# Patient Record
Sex: MALE | Race: WHITE | Employment: OTHER | URBAN - METROPOLITAN AREA
[De-identification: names, ages, dates, MRNs, and addresses within clinical notes are randomized per-mention and may not be internally consistent; named-entity substitution may affect disease eponyms.]

---

## 2019-02-11 ENCOUNTER — HOSPITAL ENCOUNTER (OUTPATIENT)
Age: 49
Discharge: HOME | End: 2019-02-13
Payer: COMMERCIAL

## 2019-02-11 ENCOUNTER — HOSPITAL ENCOUNTER (OUTPATIENT)
Dept: ULTRASOUND IMAGING | Age: 49
Discharge: HOME | End: 2019-02-13
Payer: COMMERCIAL

## 2019-02-11 DIAGNOSIS — R09.89 CARDIORESPIRATORY PROBLEMS: ICD-10-CM

## 2019-02-11 PROCEDURE — 93880 EXTRACRANIAL BILAT STUDY: CPT

## 2021-07-13 ENCOUNTER — HOSPITAL ENCOUNTER (OUTPATIENT)
Dept: CT IMAGING | Age: 51
Discharge: HOME | End: 2021-07-15
Payer: COMMERCIAL

## 2021-07-13 DIAGNOSIS — J32.9 CHRONIC SINUSITIS, UNSPECIFIED LOCATION: ICD-10-CM

## 2021-07-13 PROCEDURE — 70486 CT MAXILLOFACIAL W/O DYE: CPT

## 2024-06-26 ENCOUNTER — APPOINTMENT (OUTPATIENT)
Dept: GENERAL RADIOLOGY | Age: 54
End: 2024-06-26
Payer: COMMERCIAL

## 2024-06-26 ENCOUNTER — APPOINTMENT (OUTPATIENT)
Dept: INTERVENTIONAL RADIOLOGY/VASCULAR | Age: 54
DRG: 024 | End: 2024-06-26
Payer: COMMERCIAL

## 2024-06-26 ENCOUNTER — ANESTHESIA EVENT (OUTPATIENT)
Dept: INTERVENTIONAL RADIOLOGY/VASCULAR | Age: 54
End: 2024-06-26
Payer: COMMERCIAL

## 2024-06-26 ENCOUNTER — ANESTHESIA (OUTPATIENT)
Dept: INTERVENTIONAL RADIOLOGY/VASCULAR | Age: 54
End: 2024-06-26
Payer: COMMERCIAL

## 2024-06-26 ENCOUNTER — APPOINTMENT (OUTPATIENT)
Dept: CT IMAGING | Age: 54
End: 2024-06-26
Payer: COMMERCIAL

## 2024-06-26 ENCOUNTER — HOSPITAL ENCOUNTER (INPATIENT)
Age: 54
LOS: 5 days | Discharge: HOME HEALTH | DRG: 024 | End: 2024-07-01
Attending: EMERGENCY MEDICINE | Admitting: INTERNAL MEDICINE
Payer: COMMERCIAL

## 2024-06-26 ENCOUNTER — HOSPITAL ENCOUNTER (EMERGENCY)
Age: 54
Discharge: ANOTHER HOSPITAL | End: 2024-06-26
Attending: EMERGENCY MEDICINE
Payer: COMMERCIAL

## 2024-06-26 VITALS
WEIGHT: 260 LBS | HEART RATE: 76 BPM | OXYGEN SATURATION: 99 % | SYSTOLIC BLOOD PRESSURE: 156 MMHG | HEIGHT: 72 IN | RESPIRATION RATE: 14 BRPM | BODY MASS INDEX: 35.21 KG/M2 | TEMPERATURE: 98 F | DIASTOLIC BLOOD PRESSURE: 96 MMHG

## 2024-06-26 DIAGNOSIS — I63.511 CEREBROVASCULAR ACCIDENT (CVA) DUE TO OCCLUSION OF RIGHT MIDDLE CEREBRAL ARTERY (HCC): Primary | ICD-10-CM

## 2024-06-26 DIAGNOSIS — I63.9 CEREBROVASCULAR ACCIDENT (CVA), UNSPECIFIED MECHANISM (HCC): Primary | ICD-10-CM

## 2024-06-26 PROBLEM — I61.9 CVA (CEREBROVASCULAR ACCIDENT DUE TO INTRACEREBRAL HEMORRHAGE) (HCC): Status: ACTIVE | Noted: 2024-06-26

## 2024-06-26 LAB
ALBUMIN SERPL-MCNC: 4.5 G/DL (ref 3.5–5.2)
ALP SERPL-CCNC: 73 U/L (ref 40–129)
ALT SERPL-CCNC: 22 U/L (ref 0–40)
AMMONIA PLAS-SCNC: 16 UMOL/L (ref 16–60)
ANION GAP SERPL CALCULATED.3IONS-SCNC: 12 MMOL/L (ref 7–16)
APAP SERPL-MCNC: <5 UG/ML (ref 10–30)
AST SERPL-CCNC: 20 U/L (ref 0–39)
BACTERIA URNS QL MICRO: ABNORMAL
BASOPHILS # BLD: 0.04 K/UL (ref 0–0.2)
BASOPHILS NFR BLD: 1 % (ref 0–2)
BILIRUB SERPL-MCNC: 1.1 MG/DL (ref 0–1.2)
BILIRUB UR QL STRIP: NEGATIVE
BUN SERPL-MCNC: 14 MG/DL (ref 6–20)
CALCIUM SERPL-MCNC: 9.3 MG/DL (ref 8.6–10.2)
CHLORIDE SERPL-SCNC: 99 MMOL/L (ref 98–107)
CLARITY UR: CLEAR
CO2 SERPL-SCNC: 25 MMOL/L (ref 22–29)
COLOR UR: YELLOW
CREAT SERPL-MCNC: 1.4 MG/DL (ref 0.7–1.2)
EKG ATRIAL RATE: 74 BPM
EKG P AXIS: 65 DEGREES
EKG P-R INTERVAL: 160 MS
EKG Q-T INTERVAL: 414 MS
EKG QRS DURATION: 88 MS
EKG QTC CALCULATION (BAZETT): 459 MS
EKG R AXIS: 61 DEGREES
EKG T AXIS: 64 DEGREES
EKG VENTRICULAR RATE: 74 BPM
EOSINOPHIL # BLD: 0.09 K/UL (ref 0.05–0.5)
EOSINOPHILS RELATIVE PERCENT: 1 % (ref 0–6)
EPI CELLS #/AREA URNS HPF: ABNORMAL /HPF
ERYTHROCYTE [DISTWIDTH] IN BLOOD BY AUTOMATED COUNT: 13 % (ref 11.5–15)
ETHANOLAMINE SERPL-MCNC: <10 MG/DL (ref 0–0.08)
GFR, ESTIMATED: 63 ML/MIN/1.73M2
GLUCOSE SERPL-MCNC: 122 MG/DL (ref 74–99)
GLUCOSE UR STRIP-MCNC: NEGATIVE MG/DL
HCT VFR BLD AUTO: 49.5 % (ref 37–54)
HGB BLD-MCNC: 17.2 G/DL (ref 12.5–16.5)
HGB UR QL STRIP.AUTO: NEGATIVE
IMM GRANULOCYTES # BLD AUTO: <0.03 K/UL (ref 0–0.58)
IMM GRANULOCYTES NFR BLD: 0 % (ref 0–5)
KETONES UR STRIP-MCNC: NEGATIVE MG/DL
LEUKOCYTE ESTERASE UR QL STRIP: NEGATIVE
LYMPHOCYTES NFR BLD: 1.32 K/UL (ref 1.5–4)
LYMPHOCYTES RELATIVE PERCENT: 18 % (ref 20–42)
MCH RBC QN AUTO: 31.4 PG (ref 26–35)
MCHC RBC AUTO-ENTMCNC: 34.7 G/DL (ref 32–34.5)
MCV RBC AUTO: 90.5 FL (ref 80–99.9)
MONOCYTES NFR BLD: 0.61 K/UL (ref 0.1–0.95)
MONOCYTES NFR BLD: 8 % (ref 2–12)
NEUTROPHILS NFR BLD: 72 % (ref 43–80)
NEUTS SEG NFR BLD: 5.32 K/UL (ref 1.8–7.3)
NITRITE UR QL STRIP: NEGATIVE
PH UR STRIP: 6 [PH] (ref 5–9)
PLATELET # BLD AUTO: 278 K/UL (ref 130–450)
PMV BLD AUTO: 9.9 FL (ref 7–12)
POTASSIUM SERPL-SCNC: 3.7 MMOL/L (ref 3.5–5)
PROT SERPL-MCNC: 7.4 G/DL (ref 6.4–8.3)
PROT UR STRIP-MCNC: NEGATIVE MG/DL
RBC # BLD AUTO: 5.47 M/UL (ref 3.8–5.8)
RBC #/AREA URNS HPF: ABNORMAL /HPF
SALICYLATES SERPL-MCNC: <0.3 MG/DL (ref 0–30)
SODIUM SERPL-SCNC: 136 MMOL/L (ref 132–146)
SP GR UR STRIP: 1.02 (ref 1–1.03)
TOXIC TRICYCLIC SC,BLOOD: NEGATIVE
TROPONIN I SERPL HS-MCNC: 8 NG/L (ref 0–11)
TROPONIN I SERPL HS-MCNC: 8 NG/L (ref 0–11)
UROBILINOGEN UR STRIP-ACNC: 1 EU/DL (ref 0–1)
WBC #/AREA URNS HPF: ABNORMAL /HPF
WBC OTHER # BLD: 7.4 K/UL (ref 4.5–11.5)

## 2024-06-26 PROCEDURE — 3700000000 HC ANESTHESIA ATTENDED CARE: Performed by: STUDENT IN AN ORGANIZED HEALTH CARE EDUCATION/TRAINING PROGRAM

## 2024-06-26 PROCEDURE — 84484 ASSAY OF TROPONIN QUANT: CPT

## 2024-06-26 PROCEDURE — 80307 DRUG TEST PRSMV CHEM ANLYZR: CPT

## 2024-06-26 PROCEDURE — 36226 PLACE CATH VERTEBRAL ART: CPT | Performed by: STUDENT IN AN ORGANIZED HEALTH CARE EDUCATION/TRAINING PROGRAM

## 2024-06-26 PROCEDURE — 81001 URINALYSIS AUTO W/SCOPE: CPT

## 2024-06-26 PROCEDURE — 2580000003 HC RX 258: Performed by: EMERGENCY MEDICINE

## 2024-06-26 PROCEDURE — B31N1ZZ FLUOROSCOPY OF OTHER UPPER ARTERIES USING LOW OSMOLAR CONTRAST: ICD-10-PCS | Performed by: INTERNAL MEDICINE

## 2024-06-26 PROCEDURE — 82140 ASSAY OF AMMONIA: CPT

## 2024-06-26 PROCEDURE — 2580000003 HC RX 258: Performed by: NURSE ANESTHETIST, CERTIFIED REGISTERED

## 2024-06-26 PROCEDURE — 6360000004 HC RX CONTRAST MEDICATION: Performed by: STUDENT IN AN ORGANIZED HEALTH CARE EDUCATION/TRAINING PROGRAM

## 2024-06-26 PROCEDURE — 6360000004 HC RX CONTRAST MEDICATION: Performed by: RADIOLOGY

## 2024-06-26 PROCEDURE — 76377 3D RENDER W/INTRP POSTPROCES: CPT

## 2024-06-26 PROCEDURE — 2500000003 HC RX 250 WO HCPCS: Performed by: STUDENT IN AN ORGANIZED HEALTH CARE EDUCATION/TRAINING PROGRAM

## 2024-06-26 PROCEDURE — 6370000000 HC RX 637 (ALT 250 FOR IP): Performed by: STUDENT IN AN ORGANIZED HEALTH CARE EDUCATION/TRAINING PROGRAM

## 2024-06-26 PROCEDURE — 93005 ELECTROCARDIOGRAM TRACING: CPT

## 2024-06-26 PROCEDURE — 61645 PERQ ART M-THROMBECT &/NFS: CPT

## 2024-06-26 PROCEDURE — 2500000003 HC RX 250 WO HCPCS: Performed by: NURSE ANESTHETIST, CERTIFIED REGISTERED

## 2024-06-26 PROCEDURE — 2580000003 HC RX 258: Performed by: STUDENT IN AN ORGANIZED HEALTH CARE EDUCATION/TRAINING PROGRAM

## 2024-06-26 PROCEDURE — 99285 EMERGENCY DEPT VISIT HI MDM: CPT

## 2024-06-26 PROCEDURE — 3700000001 HC ADD 15 MINUTES (ANESTHESIA): Performed by: STUDENT IN AN ORGANIZED HEALTH CARE EDUCATION/TRAINING PROGRAM

## 2024-06-26 PROCEDURE — 6360000002 HC RX W HCPCS: Performed by: STUDENT IN AN ORGANIZED HEALTH CARE EDUCATION/TRAINING PROGRAM

## 2024-06-26 PROCEDURE — 61645 PERQ ART M-THROMBECT &/NFS: CPT | Performed by: STUDENT IN AN ORGANIZED HEALTH CARE EDUCATION/TRAINING PROGRAM

## 2024-06-26 PROCEDURE — 0042T CT BRAIN PERFUSION: CPT

## 2024-06-26 PROCEDURE — 4A03X5D MEASUREMENT OF ARTERIAL FLOW, INTRACRANIAL, EXTERNAL APPROACH: ICD-10-PCS | Performed by: INTERNAL MEDICINE

## 2024-06-26 PROCEDURE — 93010 ELECTROCARDIOGRAM REPORT: CPT | Performed by: INTERNAL MEDICINE

## 2024-06-26 PROCEDURE — 80143 DRUG ASSAY ACETAMINOPHEN: CPT

## 2024-06-26 PROCEDURE — 85347 COAGULATION TIME ACTIVATED: CPT

## 2024-06-26 PROCEDURE — C1769 GUIDE WIRE: HCPCS

## 2024-06-26 PROCEDURE — 36226 PLACE CATH VERTEBRAL ART: CPT

## 2024-06-26 PROCEDURE — 99449 NTRPROF PH1/NTRNET/EHR 31/>: CPT | Performed by: PSYCHIATRY & NEUROLOGY

## 2024-06-26 PROCEDURE — G0480 DRUG TEST DEF 1-7 CLASSES: HCPCS

## 2024-06-26 PROCEDURE — 80179 DRUG ASSAY SALICYLATE: CPT

## 2024-06-26 PROCEDURE — 6360000002 HC RX W HCPCS: Performed by: NURSE ANESTHETIST, CERTIFIED REGISTERED

## 2024-06-26 PROCEDURE — B3131ZZ FLUOROSCOPY OF RIGHT COMMON CAROTID ARTERY USING LOW OSMOLAR CONTRAST: ICD-10-PCS | Performed by: INTERNAL MEDICINE

## 2024-06-26 PROCEDURE — 85025 COMPLETE CBC W/AUTO DIFF WBC: CPT

## 2024-06-26 PROCEDURE — 2000000000 HC ICU R&B

## 2024-06-26 PROCEDURE — B3161ZZ FLUOROSCOPY OF RIGHT INTERNAL CAROTID ARTERY USING LOW OSMOLAR CONTRAST: ICD-10-PCS | Performed by: INTERNAL MEDICINE

## 2024-06-26 PROCEDURE — 70450 CT HEAD/BRAIN W/O DYE: CPT

## 2024-06-26 PROCEDURE — 71045 X-RAY EXAM CHEST 1 VIEW: CPT

## 2024-06-26 PROCEDURE — 70496 CT ANGIOGRAPHY HEAD: CPT

## 2024-06-26 PROCEDURE — 70498 CT ANGIOGRAPHY NECK: CPT

## 2024-06-26 PROCEDURE — B31D1ZZ FLUOROSCOPY OF RIGHT VERTEBRAL ARTERY USING LOW OSMOLAR CONTRAST: ICD-10-PCS | Performed by: INTERNAL MEDICINE

## 2024-06-26 PROCEDURE — 03CG3Z7 EXTIRPATION OF MATTER FROM INTRACRANIAL ARTERY USING STENT RETRIEVER, PERCUTANEOUS APPROACH: ICD-10-PCS | Performed by: INTERNAL MEDICINE

## 2024-06-26 PROCEDURE — 37799 UNLISTED PX VASCULAR SURGERY: CPT

## 2024-06-26 PROCEDURE — 80053 COMPREHEN METABOLIC PANEL: CPT

## 2024-06-26 RX ORDER — 0.9 % SODIUM CHLORIDE 0.9 %
1000 INTRAVENOUS SOLUTION INTRAVENOUS ONCE
Status: COMPLETED | OUTPATIENT
Start: 2024-06-26 | End: 2024-06-26

## 2024-06-26 RX ORDER — SUCCINYLCHOLINE CHLORIDE 20 MG/ML
INJECTION INTRAMUSCULAR; INTRAVENOUS PRN
Status: DISCONTINUED | OUTPATIENT
Start: 2024-06-26 | End: 2024-06-26 | Stop reason: SDUPTHER

## 2024-06-26 RX ORDER — FENTANYL CITRATE 50 UG/ML
INJECTION, SOLUTION INTRAMUSCULAR; INTRAVENOUS PRN
Status: DISCONTINUED | OUTPATIENT
Start: 2024-06-26 | End: 2024-06-26 | Stop reason: SDUPTHER

## 2024-06-26 RX ORDER — SODIUM CHLORIDE 9 MG/ML
INJECTION, SOLUTION INTRAVENOUS CONTINUOUS
Status: DISCONTINUED | OUTPATIENT
Start: 2024-06-26 | End: 2024-06-27

## 2024-06-26 RX ORDER — ASPIRIN 81 MG/1
324 TABLET, CHEWABLE ORAL ONCE
Status: COMPLETED | OUTPATIENT
Start: 2024-06-26 | End: 2024-06-26

## 2024-06-26 RX ORDER — PROPOFOL 10 MG/ML
INJECTION, EMULSION INTRAVENOUS PRN
Status: DISCONTINUED | OUTPATIENT
Start: 2024-06-26 | End: 2024-06-26 | Stop reason: SDUPTHER

## 2024-06-26 RX ORDER — HEPARIN SODIUM 10000 [USP'U]/ML
INJECTION, SOLUTION INTRAVENOUS; SUBCUTANEOUS PRN
Status: COMPLETED | OUTPATIENT
Start: 2024-06-26 | End: 2024-06-26

## 2024-06-26 RX ORDER — HYDRALAZINE HYDROCHLORIDE 20 MG/ML
10 INJECTION INTRAMUSCULAR; INTRAVENOUS EVERY 10 MIN PRN
Status: DISCONTINUED | OUTPATIENT
Start: 2024-06-26 | End: 2024-06-28

## 2024-06-26 RX ORDER — SODIUM CHLORIDE 9 MG/ML
INJECTION, SOLUTION INTRAVENOUS CONTINUOUS PRN
Status: DISCONTINUED | OUTPATIENT
Start: 2024-06-26 | End: 2024-06-26 | Stop reason: SDUPTHER

## 2024-06-26 RX ORDER — ROCURONIUM BROMIDE 10 MG/ML
INJECTION, SOLUTION INTRAVENOUS PRN
Status: DISCONTINUED | OUTPATIENT
Start: 2024-06-26 | End: 2024-06-26 | Stop reason: SDUPTHER

## 2024-06-26 RX ORDER — METOPROLOL SUCCINATE 50 MG/1
50 TABLET, EXTENDED RELEASE ORAL DAILY
Status: ON HOLD | COMMUNITY
Start: 2024-04-26

## 2024-06-26 RX ORDER — LABETALOL HYDROCHLORIDE 5 MG/ML
10 INJECTION, SOLUTION INTRAVENOUS EVERY 10 MIN PRN
Status: DISCONTINUED | OUTPATIENT
Start: 2024-06-26 | End: 2024-06-28

## 2024-06-26 RX ORDER — ONDANSETRON 2 MG/ML
INJECTION INTRAMUSCULAR; INTRAVENOUS PRN
Status: DISCONTINUED | OUTPATIENT
Start: 2024-06-26 | End: 2024-06-26 | Stop reason: SDUPTHER

## 2024-06-26 RX ORDER — LIDOCAINE HYDROCHLORIDE 20 MG/ML
INJECTION, SOLUTION EPIDURAL; INFILTRATION; INTRACAUDAL; PERINEURAL PRN
Status: DISCONTINUED | OUTPATIENT
Start: 2024-06-26 | End: 2024-06-26 | Stop reason: SDUPTHER

## 2024-06-26 RX ADMIN — Medication 5000 UNITS: at 17:14

## 2024-06-26 RX ADMIN — SODIUM CHLORIDE: 9 INJECTION, SOLUTION INTRAVENOUS at 20:05

## 2024-06-26 RX ADMIN — SUCCINYLCHOLINE CHLORIDE 100 MG: 20 INJECTION, SOLUTION INTRAMUSCULAR; INTRAVENOUS at 17:04

## 2024-06-26 RX ADMIN — SODIUM CHLORIDE: 9 INJECTION, SOLUTION INTRAVENOUS at 16:58

## 2024-06-26 RX ADMIN — SODIUM CHLORIDE 5 MG/HR: 0.9 INJECTION, SOLUTION INTRAVENOUS at 18:53

## 2024-06-26 RX ADMIN — PHENYLEPHRINE HYDROCHLORIDE 100 MCG: 10 INJECTION INTRAVENOUS at 17:24

## 2024-06-26 RX ADMIN — PHENYLEPHRINE HYDROCHLORIDE 50 MCG: 10 INJECTION INTRAVENOUS at 18:29

## 2024-06-26 RX ADMIN — SODIUM CHLORIDE 1000 ML: 9 INJECTION, SOLUTION INTRAVENOUS at 14:23

## 2024-06-26 RX ADMIN — PROPOFOL 180 MG: 10 INJECTION, EMULSION INTRAVENOUS at 17:04

## 2024-06-26 RX ADMIN — SUGAMMADEX 400 MG: 100 INJECTION, SOLUTION INTRAVENOUS at 18:53

## 2024-06-26 RX ADMIN — IOPAMIDOL 100 ML: 755 INJECTION, SOLUTION INTRAVENOUS at 15:08

## 2024-06-26 RX ADMIN — PHENYLEPHRINE HYDROCHLORIDE 100 MCG: 10 INJECTION INTRAVENOUS at 17:30

## 2024-06-26 RX ADMIN — PHENYLEPHRINE HYDROCHLORIDE 50 MCG: 10 INJECTION INTRAVENOUS at 18:33

## 2024-06-26 RX ADMIN — Medication 100 MG: at 17:04

## 2024-06-26 RX ADMIN — ROCURONIUM BROMIDE 10 MG: 10 INJECTION, SOLUTION INTRAVENOUS at 18:41

## 2024-06-26 RX ADMIN — ONDANSETRON HYDROCHLORIDE 4 MG: 2 SOLUTION INTRAMUSCULAR; INTRAVENOUS at 17:24

## 2024-06-26 RX ADMIN — ROCURONIUM BROMIDE 20 MG: 10 INJECTION, SOLUTION INTRAVENOUS at 17:38

## 2024-06-26 RX ADMIN — ROCURONIUM BROMIDE 20 MG: 10 INJECTION, SOLUTION INTRAVENOUS at 17:57

## 2024-06-26 RX ADMIN — Medication 1000 ML: at 17:17

## 2024-06-26 RX ADMIN — PHENYLEPHRINE HYDROCHLORIDE 50 MCG: 10 INJECTION INTRAVENOUS at 18:11

## 2024-06-26 RX ADMIN — PHENYLEPHRINE HYDROCHLORIDE 50 MCG: 10 INJECTION INTRAVENOUS at 18:25

## 2024-06-26 RX ADMIN — PHENYLEPHRINE HYDROCHLORIDE 50 MCG: 10 INJECTION INTRAVENOUS at 18:23

## 2024-06-26 RX ADMIN — PHENYLEPHRINE HYDROCHLORIDE 50 MCG: 10 INJECTION INTRAVENOUS at 18:07

## 2024-06-26 RX ADMIN — PHENYLEPHRINE HYDROCHLORIDE 50 MCG: 10 INJECTION INTRAVENOUS at 17:46

## 2024-06-26 RX ADMIN — PHENYLEPHRINE HYDROCHLORIDE 50 MCG: 10 INJECTION INTRAVENOUS at 18:37

## 2024-06-26 RX ADMIN — PHENYLEPHRINE HYDROCHLORIDE 50 MCG: 10 INJECTION INTRAVENOUS at 17:43

## 2024-06-26 RX ADMIN — PHENYLEPHRINE HYDROCHLORIDE 50 MCG: 10 INJECTION INTRAVENOUS at 17:50

## 2024-06-26 RX ADMIN — Medication 5000 UNITS: at 17:15

## 2024-06-26 RX ADMIN — IOPAMIDOL 100 ML: 612 INJECTION, SOLUTION INTRAVENOUS at 18:49

## 2024-06-26 RX ADMIN — SODIUM CHLORIDE: 9 INJECTION, SOLUTION INTRAVENOUS at 18:17

## 2024-06-26 RX ADMIN — FENTANYL CITRATE 100 MCG: 50 INJECTION, SOLUTION INTRAMUSCULAR; INTRAVENOUS at 17:04

## 2024-06-26 RX ADMIN — PHENYLEPHRINE HYDROCHLORIDE 50 MCG: 10 INJECTION INTRAVENOUS at 18:19

## 2024-06-26 RX ADMIN — PHENYLEPHRINE HYDROCHLORIDE 100 MCG: 10 INJECTION INTRAVENOUS at 17:19

## 2024-06-26 RX ADMIN — ASPIRIN 324 MG: 81 TABLET, CHEWABLE ORAL at 16:54

## 2024-06-26 RX ADMIN — PHENYLEPHRINE HYDROCHLORIDE 100 MCG: 10 INJECTION INTRAVENOUS at 17:28

## 2024-06-26 RX ADMIN — PHENYLEPHRINE HYDROCHLORIDE 50 MCG: 10 INJECTION INTRAVENOUS at 17:58

## 2024-06-26 RX ADMIN — ROCURONIUM BROMIDE 50 MG: 10 INJECTION, SOLUTION INTRAVENOUS at 17:11

## 2024-06-26 RX ADMIN — PHENYLEPHRINE HYDROCHLORIDE 50 MCG: 10 INJECTION INTRAVENOUS at 17:47

## 2024-06-26 RX ADMIN — PHENYLEPHRINE HYDROCHLORIDE 50 MCG: 10 INJECTION INTRAVENOUS at 17:41

## 2024-06-26 RX ADMIN — PHENYLEPHRINE HYDROCHLORIDE 100 MCG: 10 INJECTION INTRAVENOUS at 18:01

## 2024-06-26 RX ADMIN — PHENYLEPHRINE HYDROCHLORIDE 50 MCG: 10 INJECTION INTRAVENOUS at 18:13

## 2024-06-26 ASSESSMENT — LIFESTYLE VARIABLES
HOW MANY STANDARD DRINKS CONTAINING ALCOHOL DO YOU HAVE ON A TYPICAL DAY: 3 OR 4
HOW OFTEN DO YOU HAVE A DRINK CONTAINING ALCOHOL: 2-3 TIMES A WEEK

## 2024-06-26 ASSESSMENT — PAIN - FUNCTIONAL ASSESSMENT: PAIN_FUNCTIONAL_ASSESSMENT: NONE - DENIES PAIN

## 2024-06-26 NOTE — PROCEDURES
PROCEDURE NOTE  Date: 2024   Name: Iain Husain  YOB: 1970    Procedures: Acute thrombectomy    NEUROINTERVENTION PROCEDURE NOTE    PATIENT NAME: Iain Husain  MRN: [id number]  : 1970  DATE OF PROCEDURE: 24    Stroke Metrics  NIHSS prior to procedure: 3  IV TNK Administered: [] Yes  [x]  No  Consent obtained: [x] Yes  []  No  by [place]   Pedal Pulses checked: +2 bilaterally pre-procedure    Vitals completed per anesthesia    Neurointerventionalist: Bello Chowdhury MD  1st assistant Yamileth Mejia      Time Event Device Notes   1711 Access site puncture   Location: right femoral       1809 1st pass stent retriever and suction TICI Reperfusion thgthrthathdtheth:th th4th 1849 Access site closure 8F angioseal Sheath pulled and  Angioseal used to close arterial puncture. Puncture site cleansed and dry dressing applied. No bleeding, swelling or complications noted, no change in pulses.      IA tPA adminstered: [] Yes  [x]  No       Time Event Dose     IA tPA administered      IA tPA administered      IA tPA administered       1900 Post-procedure NIHSS unable to assess due to anesthesia/sedation and initial site assessment: site Right femoral WNL, no bleeding or hematoma noted, dressing dry and intact. +2 bilateral pedal pulses.      CT head completed.      Patient transported to [facility]  and handoff report given to Bedside RN    Family updated: [x] Yes  []  No    Blood pressure parameters: SBP <160    Antiplatelet Recommendations:  N/A    Any additional follow up scans:  CT head 2024 @ 0600

## 2024-06-26 NOTE — ANESTHESIA PRE PROCEDURE
Yisel APRN - CRNA   20 mg at 06/26/24 1738    phenylephrine (RUBY-SYNEPHRINE) injection   IntraVENous PRN ErichYisel, APRN - CRNA   50 mcg at 06/26/24 1746    ondansetron (ZOFRAN) injection   IntraVENous PRN Lisa Jungna, APRN - CRNA   4 mg at 06/26/24 1724    0.9 % sodium chloride infusion   IntraVENous Continuous PRN Yisel Jung, APRN - CRNA   New Bag at 06/26/24 1658       Allergies:  No Known Allergies    Problem List:    Patient Active Problem List   Diagnosis Code    CVA (cerebrovascular accident due to intracerebral hemorrhage) ([facility]) I61.9       Past Medical History:        Diagnosis Date    Hypertension        Past Surgical History:  No past surgical history on file.    Social History:    Social History     Tobacco Use    Smoking status: Never    Smokeless tobacco: Not on file   Substance Use Topics    Alcohol use: Yes     Comment: socially                                Counseling given: Not Answered      Vital Signs (Current):   Vitals:    06/26/24 1642 06/26/24 1649   BP: (!) 180/98    Pulse: 81    Resp: 14    Temp:  98.3 °F (36.8 °C)   TempSrc:  Oral   SpO2: 99%    Weight: 120.2 kg (265 lb)    Height: 1.88 m (6' 2\")                                               BP Readings from Last 3 Encounters:   06/26/24 (!) 180/98   06/26/24 (!) 156/96   12/24/15 141/89       NPO Status:                                                                                 BMI:   Wt Readings from Last 3 Encounters:   06/26/24 120.2 kg (265 lb)   06/26/24 117.9 kg (260 lb)   12/24/15 111.1 kg (245 lb)     Body mass index is 34.02 kg/m².    CBC:   Lab Results   Component Value Date/Time    WBC 7.4 06/26/2024 02:01 PM    RBC 5.47 06/26/2024 02:01 PM    HGB 17.2 06/26/2024 02:01 PM    HCT 49.5 06/26/2024 02:01 PM    MCV 90.5 06/26/2024 02:01 PM    RDW 13.0 06/26/2024 02:01 PM     06/26/2024 02:01 PM       CMP:   Lab Results   Component Value Date/Time     06/26/2024 02:01 PM    K 3.7 06/26/2024 02:01 PM

## 2024-06-26 NOTE — ED PROVIDER NOTES
HPI:  6/26/24,   Time: 4:33 PM EDT       Iain Husain is a 53 y.o. male presenting to the ED for stroke transfer with lvo, beginning 1 day ago.  The complaint has been persistent, severe in severity, and worsened by nothing.  [facility] from [facility].  Altered mental status for 24 hours.  Found to have right MCA occlusion at CTA on outside hospital.  Sent for interventional procedure.    Review of Systems:   Pertinent positives and negatives are stated within HPI, all other systems reviewed and are negative.          --------------------------------------------- PAST HISTORY ---------------------------------------------  Past Medical History:  has a past medical history of Hypertension.    Past Surgical History:  has no past surgical history on file.    Social History:  reports that he has never smoked. He does not have any smokeless tobacco history on file. He reports current alcohol use.    Family History: family history is not on file.     The patient’s home medications have been reviewed.    Allergies: Patient has no known allergies.        ---------------------------------------------------PHYSICAL EXAM--------------------------------------    Constitutional/General: Alert and oriented x1, distressed, anxious  Head: Normocephalic and atraumatic  Eyes: PERRL, EOMI, conjunctive normal, sclera non icteric  Mouth: Oropharynx clear, handling secretions, no trismus, no asymmetry of the posterior oropharynx or uvular edema  Neck: Supple, full ROM, non tender to palpation in the midline, no stridor, no crepitus, no meningeal signs  Respiratory: Lungs clear to auscultation bilaterally, no wheezes, rales, or rhonchi. Not in respiratory distress  Cardiovascular:  Regular rate. Regular rhythm. No murmurs, gallops, or rubs. 2+ distal pulses  Chest: No chest wall tenderness  GI:  Abdomen Soft, Non tender, Non distended.     Musculoskeletal: Moves all extremities x 4. Warm and well perfused,   Integument: skin warm and dry. No

## 2024-06-26 NOTE — CONSULTS
VASCULAR NEUROLOGY/NEUROINTERVENTIONAL SURGERY CONSULT NOTE    Consult requested by: ***  Attending: Paulette Howard MD   Reason for Consultation: ***    Patient: Iain Husain   : 1970   MRN: [id number]   Date of Service: 2024     History of Present Illness:  ***    Past Medical History:    Past Medical History:   Diagnosis Date    Hypertension        Past Surgical History:  No past surgical history on file.    Family History:  No family history on file.    Social History:  Social History       Tobacco History       Smoking Status  Never      Smokeless Tobacco Use  Unknown              Alcohol History       Alcohol Use Status  Yes Comment  socially              Drug Use       Drug Use Status  Not Asked              Sexual Activity       Sexually Active  Not Asked                    Allergies:  No Known Allergies     Review of Systems:   Constitutional: Denies fever, weight loss, fatigue and night sweats.   Neurological: Denies headache, confusion, sleep difficulties, lightheadedness, spinning sensation, vision loss, tremor, weakness, numbness or tingling, incoordination, imbalance, and incontinence of bowel and sexual dysfunction.  Psychiatric: Denies anxiety, depression and hallucinations.  Eyes: Denies blurred vision, double vision and eye pain.   ENMT: Denies difficulty swallowing, dental pain, hearing loss, and tinnitus.  Cardiovascular: Denies chest pain and palpitations.  Respiratory: Denies shortness of breath.  Genitourinary: Denies urinary incontinence and retention.  Integumentary: Denies rashes.  Endocrine: Denies polydipsia and polyuria.    Current Medications   heparin (porcine) 5000 Units in sodium chloride 0.9% 1000 mL irrigation, PRN  heparin (porcine) 5,000 Units with nitroGLYCERIN 100 mcg in sodium chloride 0.9% 1000 mL irrigation, PRN  iopamidol (ISOVUE-300) 61 % injection, PRN  dexmedeTOMIDine (PRECEDEX) 1,000 mcg in sodium chloride 0.9 % 250 mL infusion, Continuous    propofol  Test Left Arm Motor Drift {MOTOR-ARM:[id number]::\"0 - no drift, limb holds 90 (or 45) degrees for full 10 seconds\"}   5B: Test Right Arm Motor Drift {MOTOR-ARM:571126536::\"0 - no drift, limb holds 90 (or 45) degrees for full 10 seconds\"}   6A: Test Left Leg Motor Drift {MOTOR-LE::\"0 - no drift; leg holds 30 degree position for full 5 seconds\"}   6B: Test Right Leg Motor Drift {MOTOR-LE::\"0 - no drift; leg holds 30 degree position for full 5 seconds\"}   7: Test Limb Ataxia (FNF/Heel-Shin) {LIMB ATAXIA:923314054::\"0 - absent\"}   8: Test Sensation {SENSORY:422136731::\"0 - normal; no sensory loss\"}   9: Test Language/Aphasia {BEST LANGUAGE:488897544::\"0 - no aphasia, normal\"}   10: Test Dysarthria {DYSARTHRIA:917523512::\"0 - normal\"}   11: Test Extinction/Inattention {EXTINCTION AND INATTENTION:542753158::\"0 - no abnormality\"}   Total ***              Current Functional Status(Modified Oberlin Scale):  {Blank single:50544::\"6=Dead\",\"5=Severe disability; bedridden, incontinent and requiring constant nursing care and attention\",\"4=Moderately severe disability; unable to walk and attend to bodily needs without assistance\",\"3=Moderate disability; requiring some help, but able to walk without assistance\",\"2=Slight disability; unable to carry out all previous activities, but able to look after own affairs without assistance\",\"1=No significant disability despite symptoms; able to carry out all usual duties and activities\",\"0=No symptoms at all\"}     Neuroimaging:  ***    Labs:  {*** HELP TEXT ***    This SmartLink requires parameters for processing. Parameters allow for more information to be given to the SmartLink. This allows you to request specific information by giving the SmartLink precise direction.    The SmartLink accepts a list of result component base names  by commas. You can also request the number of results to display for each component. To indicate the number of results for each

## 2024-06-26 NOTE — PROGRESS NOTES
Patient admitted to NSICU with the following belongings:  Pants and Shirt. The following belongings admitted with the patient, none, were sent home with the patient's family.     4 Eyes Skin Assessment     NAME:  Iain Husain  YOB: 1970  MEDICAL RECORD NUMBER:  [id number]    The patient is being assessed for  Admission    I agree that at least one RN has performed a thorough Head to Toe Skin Assessment on the patient. ALL assessment sites listed below have been assessed.      Areas assessed by both nurses:    Head, Face, Ears, Shoulders, Back, Chest, Arms, Elbows, Hands, Sacrum. Buttock, Coccyx, Ischium, and Legs. Feet and Heels        Does the Patient have a Wound? No noted wound(s)       Kaden Prevention initiated by RN: Yes  Wound Care Orders initiated by RN: No    Pressure Injury (Stage 3,4, Unstageable, DTI, NWPT, and Complex wounds) if present, place Wound referral order by RN under : No    New Ostomies, if present place, Ostomy referral order under : No     Nurse 1 eSignature: Electronically signed by Barbara Lin RN on 6/27/24 at 4:07 PM EDT    **SHARE this note so that the co-signing nurse can place an eSignature**    Nurse 2 eSignature: Electronically signed by Uday Guerrero RN on 6/26/24 at 7:46 PM EDT

## 2024-06-26 NOTE — ANESTHESIA PROCEDURE NOTES
Arterial Line:    An arterial line was placed using surface landmarks, in the OR for the following indication(s): continuous blood pressure monitoring and blood sampling needed.    A 20 gauge (size), 1 and 3/8 inch (length), Arrow (type) catheter was placed, Seldinger technique not used, into the left radial artery, secured by tape and Tegaderm.  Anesthesia type: General    Events:  patient tolerated procedure well with no complications.6/26/2024 5:11 PM  Resident/CRNA: Yisel Jung APRN - CRNA  Performed: Resident/CRNA   Preanesthetic Checklist  Completed: patient identified, IV checked, site marked, risks and benefits discussed, surgical/procedural consents, equipment checked, pre-op evaluation, timeout performed, anesthesia consent given, oxygen available and monitors applied/VS acknowledged          
evaluation of HA, dizziness,   UA, Urine pregnancy, CBC, Fluids, meclizine, Zofran  reassess.

## 2024-06-26 NOTE — VIRTUAL HEALTH
[facility] Stroke and Telestroke Consult for  [facility] Stroke Alert through [facility] @ 14:02  6/26/2024 4:20 PM    Pt Name: Iain Husain  MRN: [id number]  YOB: 1970  Date of evaluation: 6/26/2024  Primary Care Physician: Girma Kay MD  Reason for Evaluation: Stroke Evaluation with Discussion with Ed or primary team with Telemedicine and stroke evaluation with Review of imaging and labs    Iain Husain is a 53 y.o. male who presents with history of htn and last well 14:30 on June 25, 2024. NIh 3 for encephalopathy, some perseveration and reported aphasia.  No motor deficits or facial droop. Out of window for iv tnk    LKS 6- 14:30  NIH:  3    Allergies  has No Known Allergies.  Medications  Prior to Admission medications    Medication Sig Start Date End Date Taking? Authorizing Provider   metoprolol succinate (TOPROL XL) 50 MG extended release tablet Take 1 tablet by mouth daily 4/26/24  Yes ProviderKonstantin MD   irbesartan-hydrochlorothiazide (AVALIDE) 150-12.5 MG per tablet Take 1 tablet by mouth daily    Provider, MD Konstantin    Scheduled Meds:  Continuous Infusions:  PRN Meds:.  Past Medical History   has a past medical history of Hypertension.  Social History  Social History     Socioeconomic History    Marital status:      Spouse name: Not on file    Number of children: Not on file    Years of education: Not on file    Highest education level: Not on file   Occupational History    Not on file   Tobacco Use    Smoking status: Never    Smokeless tobacco: Not on file   Substance and Sexual Activity    Alcohol use: Yes     Comment: socially    Drug use: Not on file    Sexual activity: Not on file   Other Topics Concern    Not on file   Social History Narrative    Not on file     Social Determinants of Health     Financial Resource Strain: Not on file   Food Insecurity: Not on file   Transportation Needs: Not on file   Physical Activity: Not

## 2024-06-26 NOTE — PROCEDURES
Neurointerventional surgery brief post procedure note    This is a brief post operative note that serves as immediate documentation to communicate with other clinicians and update them about the procedure.  For complete documentation purposes, the final operative report will be placed in PACS.  To review the document, please go under the imaging tab and click on the relevant IR procedure.      Date of Service: 24    Patient Name: Iain Husain   : 1970  Medical record number:  [id number]    Procedure: Emergent catheter-based cervical and cerebral angiogram with right MCA thrombectomy.  Physician: Bello Chowdhury MD.  Assistant: RT RadhaR.  Preoperative diagnosis: High-grade right MCA M2 stenosis versus mid to distal M2 occlusion.  Postoperative diagnosis: Subocclusive thrombus in right MCA M2 branch.  Access: Right common femoral artery.  Vessels injected: Right ICA, right MCA, right subclavian artery, right vertebral artery.  Hemostasis: 8 Turkish VIP Angio-Seal device.  Anesthesia: General anesthesia with endotracheal intubation.  Specimens: None.  Blood loss: 70 cc.  Complications: None immediate.    Impression/Findings:   1.  Equally dominant trifurcate right MCA M2 branching pattern.  There is layering of subocclusive thrombus in one of these M2 branches supplying the right temporoparietal region with significant capillary perfusion deficit.  This is now status post mechanical thrombectomy x 1 attempt using combined stent retriever and distal aspiration technique with complete recanalization of the right MCA vasculature.  2.  Residual, less than 50% stenosis of the right MCA M2 branch (which had subocclusive thrombus) at the end of thrombectomy.  3.  Intraoperative Patti CT head with no evidence of hemorrhage in the right hemisphere.    Plan:  1. Q15 min x 2 hours, Q30 min x 6 hours, Q60 min x 16 hours and then per unit protocol.   2. Strict bed rest for 3 hours post groin closure.  3.

## 2024-06-26 NOTE — ED PROVIDER NOTES
physician.    Records Reviewed: EKG from 12/24/2015 reviewed in comparison to today's.    I am the Primary Clinician of Record.    CONSULTS: (Who and What was discussed)  None    FINAL IMPRESSION      1. Cerebrovascular accident (CVA), unspecified mechanism (HCC)          DISPOSITION/PLAN   DISPOSITION Decision To Transfer 06/26/2024 03:51:28 PM    PATIENT REFERRED TO:  No follow-up provider specified.    DISCHARGE MEDICATIONS:  Discharge Medication List as of 6/26/2024  4:24 PM               (Please note that portions of this note were completed with a voice recognition program.  Efforts were made to edit the dictations but occasionally words are mis-transcribed.)    Rashid Schneider DO (electronically signed)

## 2024-06-27 ENCOUNTER — APPOINTMENT (OUTPATIENT)
Dept: CT IMAGING | Age: 54
DRG: 024 | End: 2024-06-27
Attending: STUDENT IN AN ORGANIZED HEALTH CARE EDUCATION/TRAINING PROGRAM
Payer: COMMERCIAL

## 2024-06-27 ENCOUNTER — APPOINTMENT (OUTPATIENT)
Age: 54
DRG: 024 | End: 2024-06-27
Attending: STUDENT IN AN ORGANIZED HEALTH CARE EDUCATION/TRAINING PROGRAM
Payer: COMMERCIAL

## 2024-06-27 ENCOUNTER — APPOINTMENT (OUTPATIENT)
Dept: MRI IMAGING | Age: 54
DRG: 024 | End: 2024-06-27
Payer: COMMERCIAL

## 2024-06-27 PROBLEM — I10 PRIMARY HYPERTENSION: Status: ACTIVE | Noted: 2024-06-27

## 2024-06-27 LAB
ACTIVATED CLOTTING TIME, LOW RANGE: 145 SEC
ANION GAP SERPL CALCULATED.3IONS-SCNC: 14 MMOL/L (ref 7–16)
BASOPHILS # BLD: 0.04 K/UL (ref 0–0.2)
BASOPHILS NFR BLD: 0 % (ref 0–2)
BUN SERPL-MCNC: 11 MG/DL (ref 6–20)
CA-I BLD-SCNC: 1.2 MMOL/L (ref 1.15–1.33)
CALCIUM SERPL-MCNC: 8.9 MG/DL (ref 8.6–10.2)
CHLORIDE SERPL-SCNC: 106 MMOL/L (ref 98–107)
CHOLEST SERPL-MCNC: 165 MG/DL
CO2 SERPL-SCNC: 20 MMOL/L (ref 22–29)
CREAT SERPL-MCNC: 1.1 MG/DL (ref 0.7–1.2)
EOSINOPHIL # BLD: 0.03 K/UL (ref 0.05–0.5)
EOSINOPHILS RELATIVE PERCENT: 0 % (ref 0–6)
ERYTHROCYTE [DISTWIDTH] IN BLOOD BY AUTOMATED COUNT: 13.1 % (ref 11.5–15)
GFR, ESTIMATED: 81 ML/MIN/1.73M2
GLUCOSE SERPL-MCNC: 108 MG/DL (ref 74–99)
HBA1C MFR BLD: 4.8 % (ref 4–5.6)
HCT VFR BLD AUTO: 45.2 % (ref 37–54)
HDLC SERPL-MCNC: 44 MG/DL
HGB BLD-MCNC: 15.5 G/DL (ref 12.5–16.5)
IMM GRANULOCYTES # BLD AUTO: 0.03 K/UL (ref 0–0.58)
IMM GRANULOCYTES NFR BLD: 0 % (ref 0–5)
LDLC SERPL CALC-MCNC: 102 MG/DL
LYMPHOCYTES NFR BLD: 1.12 K/UL (ref 1.5–4)
LYMPHOCYTES RELATIVE PERCENT: 11 % (ref 20–42)
MAGNESIUM SERPL-MCNC: 2 MG/DL (ref 1.6–2.6)
MCH RBC QN AUTO: 31.3 PG (ref 26–35)
MCHC RBC AUTO-ENTMCNC: 34.3 G/DL (ref 32–34.5)
MCV RBC AUTO: 91.1 FL (ref 80–99.9)
MONOCYTES NFR BLD: 0.81 K/UL (ref 0.1–0.95)
MONOCYTES NFR BLD: 8 % (ref 2–12)
NEUTROPHILS NFR BLD: 80 % (ref 43–80)
NEUTS SEG NFR BLD: 7.87 K/UL (ref 1.8–7.3)
PHOSPHATE SERPL-MCNC: 3.1 MG/DL (ref 2.5–4.5)
PLATELET # BLD AUTO: 247 K/UL (ref 130–450)
PMV BLD AUTO: 10.2 FL (ref 7–12)
POTASSIUM SERPL-SCNC: 4 MMOL/L (ref 3.5–5)
RBC # BLD AUTO: 4.96 M/UL (ref 3.8–5.8)
SODIUM SERPL-SCNC: 140 MMOL/L (ref 132–146)
TRIGL SERPL-MCNC: 93 MG/DL
VLDLC SERPL CALC-MCNC: 19 MG/DL
WBC OTHER # BLD: 9.9 K/UL (ref 4.5–11.5)

## 2024-06-27 PROCEDURE — 99252 IP/OBS CONSLTJ NEW/EST SF 35: CPT | Performed by: NURSE PRACTITIONER

## 2024-06-27 PROCEDURE — 92523 SPEECH SOUND LANG COMPREHEN: CPT | Performed by: SPEECH-LANGUAGE PATHOLOGIST

## 2024-06-27 PROCEDURE — 51701 INSERT BLADDER CATHETER: CPT

## 2024-06-27 PROCEDURE — 7100000001 HC PACU RECOVERY - ADDTL 15 MIN

## 2024-06-27 PROCEDURE — 6370000000 HC RX 637 (ALT 250 FOR IP): Performed by: STUDENT IN AN ORGANIZED HEALTH CARE EDUCATION/TRAINING PROGRAM

## 2024-06-27 PROCEDURE — 2000000000 HC ICU R&B

## 2024-06-27 PROCEDURE — 99233 SBSQ HOSP IP/OBS HIGH 50: CPT | Performed by: STUDENT IN AN ORGANIZED HEALTH CARE EDUCATION/TRAINING PROGRAM

## 2024-06-27 PROCEDURE — 6360000002 HC RX W HCPCS: Performed by: SURGERY

## 2024-06-27 PROCEDURE — 6360000002 HC RX W HCPCS: Performed by: STUDENT IN AN ORGANIZED HEALTH CARE EDUCATION/TRAINING PROGRAM

## 2024-06-27 PROCEDURE — 97535 SELF CARE MNGMENT TRAINING: CPT

## 2024-06-27 PROCEDURE — 51798 US URINE CAPACITY MEASURE: CPT

## 2024-06-27 PROCEDURE — 83036 HEMOGLOBIN GLYCOSYLATED A1C: CPT

## 2024-06-27 PROCEDURE — 97530 THERAPEUTIC ACTIVITIES: CPT

## 2024-06-27 PROCEDURE — 82330 ASSAY OF CALCIUM: CPT

## 2024-06-27 PROCEDURE — 70450 CT HEAD/BRAIN W/O DYE: CPT

## 2024-06-27 PROCEDURE — 85025 COMPLETE CBC W/AUTO DIFF WBC: CPT

## 2024-06-27 PROCEDURE — 37799 UNLISTED PX VASCULAR SURGERY: CPT

## 2024-06-27 PROCEDURE — 80061 LIPID PANEL: CPT

## 2024-06-27 PROCEDURE — 7100000000 HC PACU RECOVERY - FIRST 15 MIN

## 2024-06-27 PROCEDURE — 97166 OT EVAL MOD COMPLEX 45 MIN: CPT

## 2024-06-27 PROCEDURE — 93306 TTE W/DOPPLER COMPLETE: CPT

## 2024-06-27 PROCEDURE — 92507 TX SP LANG VOICE COMM INDIV: CPT | Performed by: SPEECH-LANGUAGE PATHOLOGIST

## 2024-06-27 PROCEDURE — 6370000000 HC RX 637 (ALT 250 FOR IP): Performed by: NURSE PRACTITIONER

## 2024-06-27 PROCEDURE — 80048 BASIC METABOLIC PNL TOTAL CA: CPT

## 2024-06-27 PROCEDURE — 83735 ASSAY OF MAGNESIUM: CPT

## 2024-06-27 PROCEDURE — 97162 PT EVAL MOD COMPLEX 30 MIN: CPT

## 2024-06-27 PROCEDURE — 84100 ASSAY OF PHOSPHORUS: CPT

## 2024-06-27 PROCEDURE — 70551 MRI BRAIN STEM W/O DYE: CPT

## 2024-06-27 RX ORDER — ACETAMINOPHEN 325 MG/1
650 TABLET ORAL EVERY 4 HOURS PRN
Status: DISCONTINUED | OUTPATIENT
Start: 2024-06-27 | End: 2024-07-01 | Stop reason: HOSPADM

## 2024-06-27 RX ORDER — CLOPIDOGREL BISULFATE 75 MG/1
75 TABLET ORAL DAILY
Status: DISCONTINUED | OUTPATIENT
Start: 2024-06-28 | End: 2024-07-01 | Stop reason: HOSPADM

## 2024-06-27 RX ORDER — CLOPIDOGREL BISULFATE 75 MG/1
300 TABLET ORAL ONCE
Status: COMPLETED | OUTPATIENT
Start: 2024-06-27 | End: 2024-06-27

## 2024-06-27 RX ORDER — ASPIRIN 325 MG
325 TABLET, DELAYED RELEASE (ENTERIC COATED) ORAL DAILY
Status: DISCONTINUED | OUTPATIENT
Start: 2024-06-27 | End: 2024-07-01 | Stop reason: HOSPADM

## 2024-06-27 RX ORDER — SENNOSIDES A AND B 8.6 MG/1
1 TABLET, FILM COATED ORAL NIGHTLY
Status: DISCONTINUED | OUTPATIENT
Start: 2024-06-27 | End: 2024-07-01 | Stop reason: HOSPADM

## 2024-06-27 RX ORDER — ENOXAPARIN SODIUM 100 MG/ML
30 INJECTION SUBCUTANEOUS EVERY 12 HOURS
Status: DISCONTINUED | OUTPATIENT
Start: 2024-06-27 | End: 2024-06-28

## 2024-06-27 RX ORDER — ONDANSETRON 2 MG/ML
4 INJECTION INTRAMUSCULAR; INTRAVENOUS EVERY 6 HOURS PRN
Status: DISCONTINUED | OUTPATIENT
Start: 2024-06-27 | End: 2024-07-01 | Stop reason: HOSPADM

## 2024-06-27 RX ORDER — LOSARTAN POTASSIUM 50 MG/1
50 TABLET ORAL DAILY
Status: DISCONTINUED | OUTPATIENT
Start: 2024-06-27 | End: 2024-07-01 | Stop reason: HOSPADM

## 2024-06-27 RX ORDER — ATORVASTATIN CALCIUM 40 MG/1
40 TABLET, FILM COATED ORAL NIGHTLY
Status: DISCONTINUED | OUTPATIENT
Start: 2024-06-27 | End: 2024-07-01 | Stop reason: HOSPADM

## 2024-06-27 RX ORDER — POLYETHYLENE GLYCOL 3350 17 G/17G
17 POWDER, FOR SOLUTION ORAL DAILY
Status: DISCONTINUED | OUTPATIENT
Start: 2024-06-27 | End: 2024-07-01 | Stop reason: HOSPADM

## 2024-06-27 RX ADMIN — LABETALOL HYDROCHLORIDE 10 MG: 5 INJECTION, SOLUTION INTRAVENOUS at 01:30

## 2024-06-27 RX ADMIN — ENOXAPARIN SODIUM 30 MG: 100 INJECTION SUBCUTANEOUS at 23:02

## 2024-06-27 RX ADMIN — ENOXAPARIN SODIUM 30 MG: 100 INJECTION SUBCUTANEOUS at 11:46

## 2024-06-27 RX ADMIN — METOPROLOL TARTRATE 25 MG: 25 TABLET, FILM COATED ORAL at 08:11

## 2024-06-27 RX ADMIN — ATORVASTATIN CALCIUM 40 MG: 40 TABLET, FILM COATED ORAL at 19:55

## 2024-06-27 RX ADMIN — METOPROLOL TARTRATE 25 MG: 25 TABLET, FILM COATED ORAL at 19:55

## 2024-06-27 RX ADMIN — LOSARTAN POTASSIUM 50 MG: 50 TABLET, FILM COATED ORAL at 08:11

## 2024-06-27 RX ADMIN — CLOPIDOGREL BISULFATE 300 MG: 75 TABLET ORAL at 18:36

## 2024-06-27 RX ADMIN — HYDRALAZINE HYDROCHLORIDE 10 MG: 20 INJECTION, SOLUTION INTRAMUSCULAR; INTRAVENOUS at 04:49

## 2024-06-27 RX ADMIN — ASPIRIN 325 MG: 325 TABLET, COATED ORAL at 11:46

## 2024-06-27 ASSESSMENT — PAIN SCALES - GENERAL
PAINLEVEL_OUTOF10: 0
PAINLEVEL_OUTOF10: 0

## 2024-06-27 NOTE — PROGRESS NOTES
SPEECH/LANGUAGE PATHOLOGY  SPEECH/LANGUAGE/COGNITIVE EVALUATION   and PLAN OF CARE      PATIENT NAME:  Iain Husain  (male)     MRN:  [id number]    :  1970  (53 y.o.)  STATUS:  Inpatient: Room 4524/4524-A    TODAY'S DATE:  24    SLP eval and treat  Start:  24,   End:  24,   ONE TIME,   Standing Count:  1 Occurrences,   R       Yolande, CONCHITA OrourkeN - CNS  REASON FOR REFERRAL:  s/p stroke s/p thrombectomy; only speech eval warranted per NP  EVALUATING THERAPIST: Sulma Bethea  SUPERVISING THERAPIST: Yvonne Ch    ADMITTING DIAGNOSIS: CVA (cerebrovascular accident due to intracerebral hemorrhage) (HCC) [I61.9]  Cerebrovascular accident (CVA) due to occlusion of right middle cerebral artery (HCC) [I63.511]    VISIT DIAGNOSIS:   Visit Diagnoses         Codes    Cerebrovascular accident (CVA) due to occlusion of right middle cerebral artery (HCC)    -  Primary I63.511               SPEECH THERAPY  PLAN OF CARE   The speech therapy  POC is established based on physician order, speech pathology diagnosis and results of clinical assessment     SPEECH PATHOLOGY DIAGNOSIS:    questionable receptive aphasia, moderate to severe expressive aphasia, cognitive-linguistic impairment r/t memory     Speech Pathology intervention is recommended up to 6 times per week for LOS or when goals are met with emphasis on the following:      Conditions Requiring Skilled Therapeutic Intervention for speech, language and/or cognition    Receptive Aphasia  Expressive Aphasia   Anomia  Decreased short and long term memory  Decreased thought organization    Specific Speech Therapy Interventions to Include:   Confrontational Naming task training   Therapeutic tasks for paraphasic errors  Receptive language training   Expressive language training   Therapeutic tasks for Cognition (particularly memory)    Specific instructions for next treatment:     To initiate POC    SHORT/LONG TERM  Active Problem List   Diagnosis    CVA (cerebrovascular accident due to intracerebral hemorrhage) (HCC)    Primary hypertension       INTERVENTION  CPT Code: 91352  speech/language tx    Pt was provided with carrier phrases, semantic and phonemic cueing to support word retrieval. Imitation tasks were broken down with articulatory and visual cues provided with limited success. Pt reported to be aware of perseveration. Alternate modalities of communication including gestures and writing were encouraged. Pt was also instructed on strategies to support retrieval  including describing word and returning to word with delay.     Sulma Bethea, ELVIA   Clinician

## 2024-06-27 NOTE — PROGRESS NOTES
VASCULAR NEUROLOGY/NEUROINTERVENTIONAL SURGERY PROGRESS NOTE    Subjective:  Status post right MCA thrombectomy last night.  No acute neurological events overnight.    Objective:    Awake, alert, does not follow commands consistently.  Intact horizontal and vertical extraocular movements.  Continues to have patchy visual field deficits (more pronounced in the inferior quadrant) in the left visual field.  Impaired comprehension, expression, naming and repetition.  I think he is right brain dominant even though he is right-handed.  No facial droop.  No long tract signs.    Heart sounds normal.  Sinus rhythm on telemetry monitor.  Chest clear.  Abdomen soft.  Peripheral pulses well felt.  Right groin site is clean, dry and intact with no evidence of hematoma.    NIH Stroke Scale:    1A: Level of Consciousness 0 - alert; keenly responsive   1B: Ask Month and Age 1 - answers one question correctly   1C: Tell Patient To Open and Close Eyes, then Hand  Squeeze 0 - performs both tasks correctly   2: Test Horizontal Extraocular Movements 0 - normal   3: Test Visual Fields 1 - partial hemianopia   4: Test Facial Palsy 0 - normal symmetric movement   5A: Test Left Arm Motor Drift 0 - no drift, limb holds 90 (or 45) degrees for full 10 seconds   5B: Test Right Arm Motor Drift 0 - no drift, limb holds 90 (or 45) degrees for full 10 seconds   6A: Test Left Leg Motor Drift 0 - no drift; leg holds 30 degree position for full 5 seconds   6B: Test Right Leg Motor Drift 0 - no drift; leg holds 30 degree position for full 5 seconds   7: Test Limb Ataxia (FNF/Heel-Shin) 0 - absent   8: Test Sensation 0 - normal; no sensory loss   9: Test Language/Aphasia 1 - mild to moderate aphasia; some obvious loss of fluency or facility of comprehension without significant limitation on ideas expressed or form of expression.  Reduction of speech and/or comprehension, however, makes conversation about provided materials difficult or impossible.

## 2024-06-27 NOTE — PROGRESS NOTES
OCCUPATIONAL THERAPY INITIAL EVALUATION    [facility]  [address], [place]       Date:2024                                                               Patient Name: Iain Husain  MRN: [id number]  : 1970  Room: [address] [place] [address]-A    Evaluating OT: Yesenia Jane, OTR/L [id number]    Referring Provider:   Haven Esparza APRN - CNS      Specific Provider Orders/Date: OT eval and treat (24)        Diagnosis: CVA (cerebrovascular accident due to intracerebral hemorrhage) (HCC) [I61.9]  Cerebrovascular accident (CVA) due to occlusion of right middle cerebral artery (HCC) [I63.511]         Reason for admission:  53 y.o. male presenting to the ED for stroke. Altered mental status for 24 hours. Imaging showed a right posterior parietal occipital infarction.  NIH 2.       Surgery/Procedures:  Right MCA thrombectomy            Pertinent Medical History:    Past Medical History:   Diagnosis Date    Hypertension           *Precautions:  Fall Risk, aphasia, SBP<160, visual impairment L field. alarms    Assessment of current deficits   [x] Functional mobility  [x]ADLs  [x] Strength               [x]Cognition   [x] Functional transfers   [x] IADLs         [x] Safety Awareness   [x]Endurance   [] Fine Coordination        [x] ROM     [x] Vision/perception   []Sensation    []Gross Motor Coordination [x] Balance   [] Delirium                  []Motor Control     [x] Communication    OT PLAN OF CARE   OT POC based on physician orders, patient diagnosis and results of clinical assessment.       Frequency/Duration: 1-3 days/wk for 1-2 weeks PRN    Specific OT Treatment to include:   ADL retraining/adapted techniques and AE recommendations to increase functional independence within precautions                    Energy conservation techniques to improve tolerance for selfcare routine   Functional transfer/mobility training/DME  [id number]   Orthotic Management 22954     Neuro Re-Ed [id number]     Non-Billable Time        Evaluation time includes thorough review of current medical information, gathering information on past medical history/social history and prior level of function, completion of standardized testing/informal observation of tasks, assessment of data and development of POC/Goals.     Yesenia Jane, OTR/L 9026

## 2024-06-27 NOTE — CONSULTS
[facility]  Critical Care  Critical Care Consult Note  6/27/2024      Date of Admission: 06/26/2024    CC: Follow up for stroke    HOSPITAL COURSE/OVERNIGHT EVENTS:  54 yo man presented to ED at [facility] with blurred vision & memory issues.  LKW was  hours prior to presentation. PMH includes HTN.  Initial /100.  NIH SS 2.  Imaging showed a right posterior parietal occipital infarction & right MCA M2 occlusion.  Not a candidate for TNK.  Transferred to [facility] for ongoing care.  ASA prior to procedure.  Underwent Right MCA thrombectomy  6/27 No issues overnight.  Passed swallow.  Did not require Cardene infusion.  Did receive 2 doses of antihypertensive agents.      PMH:   Past Medical History:   Diagnosis Date    Hypertension      PSH: No past surgical history on file.    Home Medications:   Prior to Admission medications    Medication Sig Start Date End Date Taking? Authorizing Provider   metoprolol succinate (TOPROL XL) 50 MG extended release tablet Take 1 tablet by mouth daily 4/26/24   ProviderKonstantin MD   irbesartan-hydrochlorothiazide (AVALIDE) 150-12.5 MG per tablet Take 1 tablet by mouth daily    Provider, MD Konstantin     Allergies: No Known Allergies    Social History:  Social History     Socioeconomic History    Marital status:      Spouse name: Not on file    Number of children: Not on file    Years of education: Not on file    Highest education level: Not on file   Occupational History    Not on file   Tobacco Use    Smoking status: Never    Smokeless tobacco: Not on file   Substance and Sexual Activity    Alcohol use: Yes     Comment: socially    Drug use: Not on file    Sexual activity: Not on file   Other Topics Concern    Not on file   Social History Narrative    Not on file     Social Determinants of Health     Financial Resource Strain: Not on file   Food Insecurity: Patient Unable To Answer (6/27/2024)    Hunger Vital Sign     Worried About Running Out

## 2024-06-27 NOTE — PLAN OF CARE
Problem: Discharge Planning  Goal: Discharge to home or other facility with appropriate resources  Outcome: Progressing     Problem: Skin/Tissue Integrity  Goal: Absence of new skin breakdown  Description: 1.  Monitor for areas of redness and/or skin breakdown  2.  Assess vascular access sites hourly  3.  Every 4-6 hours minimum:  Change oxygen saturation probe site  4.  Every 4-6 hours:  If on nasal continuous positive airway pressure, respiratory therapy assess nares and determine need for appliance change or resting period.  6/27/2024 0915 by Zhang Jefferson RN  Outcome: Progressing  6/27/2024 0052 by Kaylie Mullins RN  Outcome: Progressing     Problem: ABCDS Injury Assessment  Goal: Absence of physical injury  Outcome: Progressing     Problem: Neurosensory - Adult  Goal: Achieves stable or improved neurological status  6/27/2024 0915 by Zhang Jefferson RN  Outcome: Progressing  6/27/2024 0052 by Kaylie Mullins RN  Outcome: Progressing  Flowsheets (Taken 6/27/2024 0052)  Achieves stable or improved neurological status:   Assess for and report changes in neurological status   Initiate measures to prevent increased intracranial pressure   Maintain blood pressure and fluid volume within ordered parameters to optimize cerebral perfusion and minimize risk of hemorrhage   Monitor temperature, glucose, and sodium. Initiate appropriate interventions as ordered  Goal: Absence of seizures  6/27/2024 0915 by Zhang Jefferson RN  Outcome: Progressing  6/27/2024 0052 by Kaylie Mullins RN  Outcome: Progressing  Flowsheets (Taken 6/27/2024 0052)  Absence of seizures:   Monitor for seizure activity.  If seizure occurs, document type and location of movements and any associated apnea   If seizure occurs, turn head to side and suction secretions as needed   Administer anticonvulsants as ordered   Support airway/breathing, administer oxygen as needed   Diagnostic studies as ordered  Goal: Remains free of injury related to  injury  Outcome: Progressing     Problem: Pain  Goal: Verbalizes/displays adequate comfort level or baseline comfort level  Outcome: Progressing

## 2024-06-27 NOTE — PROGRESS NOTES
Physical Therapy  Physical Therapy Initial Assessment     Name: Iain Husain  : 1970  MRN: [id number]      Date of Service: 2024    Evaluating PT:  Cruz Womack PT, DPT [id number]    Room #:  4524/4524-A  Diagnosis:  CVA (cerebrovascular accident due to intracerebral hemorrhage) (HCC) [I61.9]  Cerebrovascular accident (CVA) due to occlusion of right middle cerebral artery (HCC) [I63.511]  PMHx/PSHx:    Past Medical History:   Diagnosis Date    Hypertension      Procedure/Surgery:   Emergent catheter-based cervical and cerebral angiogram with right MCA thrombectomy.   Precautions:  Falls, SPB <160, aphasia, vision, chair alarm   Equipment Needs:  TBD    SUBJECTIVE:    Pt lives with wife in a 1 story home with 1-2 step(s) to enter and 0 rail(s).      Pt ambulated with no AD PTA.    OBJECTIVE:   Initial Evaluation  Date: 24 Treatment Short Term/ Long Term   Goals   AM-PAC 6 Clicks      Was pt agreeable to Eval/treatment? Yes      Does pt have pain? No c/o pain      Bed Mobility  Rolling: NT  Supine to sit: Leonidas   Sit to supine: NT  Scooting: Leonidas   Independent    Transfers Sit to stand: Leonidas  Stand to sit: Leonidas  Stand pivot: Leonidas with no device   Independent    Ambulation   75 feet x 2 reps with Leonidas with no device   >400 feet with Independent    Stair negotiation: ascended and descended NT  >2 steps with 1 rail Mod Independent    ROM BUE:  Defer to OT note  BLE:  WFL     Strength BUE:  Defer to OT note  BLE:  4/5  Increase by 1/3 MMT grade   Balance Sitting EOB:  SBA  Dynamic Standing: Leonidas no device   Sitting EOB:  Independent   Dynamic Standing:  Independent      Pt is A & O x 2, self, location with choices  CAM-ICU: NT  RASS: 0  Sensation:  No reports of paresthesias   Edema:  none     Vitals:  Heart Rate at rest 64 bpm Heart Rate post session 71 bpm   SpO2 at rest 96% SpO2 post session 95%   Blood Pressure at rest 130/87 mmHg Blood Pressure post session 135/91 mmHg   BP ambulating:  contractures  [x] Safety and Education Training   [x] Patient/Caregiver Education   [] HEP  [] Other     PT long term treatment goals are located in above grid    Frequency of treatments: 2-5x/week x 1-2 weeks.    Time in  1304  Time out  1342    Total Treatment Time  23 minutes     Evaluation Time includes thorough review of current medical information, gathering information on past medical history/social history and prior level of function, completion of standardized testing/informal observation of tasks, assessment of data and education on plan of care and goals.    CPT codes:  [] Low Complexity PT evaluation 55589  [x] Moderate Complexity PT evaluation 35124  [] High Complexity PT evaluation 97960  [] PT Re-evaluation 35066  [] Gait training 71537 - minutes  [] Manual therapy 05209 - minutes  [x] Therapeutic activities 08411 23 minutes  [] Therapeutic exercises 16291 - minutes  [] Neuromuscular reeducation 42651 - minutes     Sharifa White, SPT   Cruz Womack, PT, DPT  [id number]

## 2024-06-27 NOTE — H&P
[facility]  History and Physical      CHIEF COMPLAINT:    Chief Complaint   Patient presents with    Cerebrovascular Accident     Transfer from [facility] for stroke        Patient of Girma Kay MD presents with:  CVA (cerebrovascular accident due to intracerebral hemorrhage) (HCC)    History of Present Illness:   Patient is a 53-year-old male with a past medical history of hypertension who presents to the emergency room after ER to ER transfer from [facility] for altered mental status.  Patient was found to be altered by his wife but that he had been complaining of blurry vision and difficulty with his memory.  Patient had a series of CT scans with a CT head revealing findings suspicious for acute ischemia of the right posterior parietal lobe and right occipital lobe. 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 And a CT brain perfusion with ischemic penumbra of the right posterior cerebral hemisphere and a core infarct of the right posterior cerebral hemisphere.  Neuro IR was consulted and patient was taken for an emergent catheter-based cervical and cerebral angiogram with right MCA thrombectomy.  Labs revealed a mildly elevated creatinine of 1.4 however other labs relatively unremarkable.  Patient was transferred to the ICU for closer monitoring and further workup and treatment.    On evaluation he is resting comfortably in bed in no acute distress, some degree of aphasia is noted he is undergoing echocardiogram.  Family members are at bedside with whom case was discussed headaches or visual changes on my evaluation.    REVIEW OF SYSTEMS:  Pertinent negatives are above in HPI.  10 point ROS otherwise negative.      Past Medical History:   Diagnosis Date    Hypertension          No past surgical history on file.    Medications Prior to  prophylaxis PCD's  PT OT  Discharge planning      Code status: Full  Requires inpatient level of care      I have spent a total time of 70 minutes of this patient encounter reviewing chart, labs, coordinating care with interdisciplinary teams, face to face encounter with patient, providing counseling/education to patient/family.      Paulette Howard MD  6/28/2024

## 2024-06-27 NOTE — ANESTHESIA POSTPROCEDURE EVALUATION
Department of Anesthesiology  Postprocedure Note    Patient: Iain Husain  MRN: [id number]  YOB: 1970  Date of evaluation: 6/27/2024    Procedure Summary       Date: 06/26/24 Room / Location: [facility] Special Procedures; [facility] Radiology    Anesthesia Start: 1658 Anesthesia Stop: 1925    Procedures:       IR MECHANICAL ART THROMBECTOMY INTRACRANIAL      IR CARLOS CATH PLACE VERTEBRAL ART RIGHT W OR WO ANGIO Diagnosis:       (Right Distal M2)      (Right Distal M2)    Scheduled Providers: Bello Chowdhury MD Responsible Provider: Iona Witt MD    Anesthesia Type: general ASA Status: 4 - Emergent            Anesthesia Type: No value filed.    Ivy Phase I: Ivy Score: 10    Ivy Phase II:      Anesthesia Post Evaluation    Patient location during evaluation: ICU  Post-procedure mental status: back to baseline.  Airway patency: patent  Nausea & Vomiting: no nausea and no vomiting  Cardiovascular status: hemodynamically stable  Respiratory status: acceptable  Hydration status: euvolemic  Pain management: adequate        No notable events documented.

## 2024-06-27 NOTE — ACP (ADVANCE CARE PLANNING)
Advance Care Planning   Healthcare Decision Maker:    Primary Decision Maker: Candida Husain - [facility] - [phone]    Click here to complete Healthcare Decision Makers including selection of the Healthcare Decision Maker Relationship (ie \"Primary\").

## 2024-06-27 NOTE — PROGRESS NOTES
Physical Therapy  Physical Therapy Note    Name: Iian Husain  : 1970  MRN: [id number]      Date of Service: 2024  Pt would benefit from acute rehab at discharge and is in need of a PM&R consult.    Cruz Womack, PT, DPT  [id number]

## 2024-06-28 ENCOUNTER — APPOINTMENT (OUTPATIENT)
Dept: CT IMAGING | Age: 54
DRG: 024 | End: 2024-06-28
Payer: COMMERCIAL

## 2024-06-28 PROBLEM — I63.511 CEREBROVASCULAR ACCIDENT (CVA) DUE TO OCCLUSION OF RIGHT MIDDLE CEREBRAL ARTERY (HCC): Status: ACTIVE | Noted: 2024-06-28

## 2024-06-28 LAB
ANION GAP SERPL CALCULATED.3IONS-SCNC: 12 MMOL/L (ref 7–16)
BASOPHILS # BLD: 0.04 K/UL (ref 0–0.2)
BASOPHILS NFR BLD: 1 % (ref 0–2)
BUN SERPL-MCNC: 12 MG/DL (ref 6–20)
CA-I BLD-SCNC: 1.2 MMOL/L (ref 1.15–1.33)
CALCIUM SERPL-MCNC: 8.8 MG/DL (ref 8.6–10.2)
CHLORIDE SERPL-SCNC: 103 MMOL/L (ref 98–107)
CO2 SERPL-SCNC: 22 MMOL/L (ref 22–29)
CREAT SERPL-MCNC: 1.1 MG/DL (ref 0.7–1.2)
ECHO AO ASC DIAM: 3.3 CM
ECHO AO ASCENDING AORTA INDEX: 1.35 CM/M2
ECHO AO SINUS VALSALVA DIAM: 4.1 CM
ECHO AO SINUS VALSALVA INDEX: 1.67 CM/M2
ECHO AV AREA PEAK VELOCITY: 3 CM2
ECHO AV AREA VTI: 3.8 CM2
ECHO AV AREA/BSA PEAK VELOCITY: 1.2 CM2/M2
ECHO AV AREA/BSA VTI: 1.6 CM2/M2
ECHO AV CUSP MM: 2.6 CM
ECHO AV MEAN GRADIENT: 4 MMHG
ECHO AV MEAN VELOCITY: 0.9 M/S
ECHO AV PEAK GRADIENT: 7 MMHG
ECHO AV PEAK VELOCITY: 1.3 M/S
ECHO AV VELOCITY RATIO: 0.69
ECHO AV VTI: 26 CM
ECHO BSA: 2.51 M2
ECHO LA DIAMETER INDEX: 1.39 CM/M2
ECHO LA DIAMETER: 3.4 CM
ECHO LA VOL A-L A2C: 38 ML (ref 18–58)
ECHO LA VOL A-L A4C: 40 ML (ref 18–58)
ECHO LA VOL MOD A2C: 37 ML (ref 18–58)
ECHO LA VOL MOD A4C: 36 ML (ref 18–58)
ECHO LA VOLUME AREA LENGTH: 41 ML
ECHO LA VOLUME INDEX A-L A2C: 16 ML/M2 (ref 16–34)
ECHO LA VOLUME INDEX A-L A4C: 16 ML/M2 (ref 16–34)
ECHO LA VOLUME INDEX AREA LENGTH: 17 ML/M2 (ref 16–34)
ECHO LA VOLUME INDEX MOD A2C: 15 ML/M2 (ref 16–34)
ECHO LA VOLUME INDEX MOD A4C: 15 ML/M2 (ref 16–34)
ECHO LV FRACTIONAL SHORTENING: 26 % (ref 28–44)
ECHO LV INTERNAL DIMENSION DIASTOLE INDEX: 1.92 CM/M2
ECHO LV INTERNAL DIMENSION DIASTOLIC: 4.7 CM (ref 4.2–5.9)
ECHO LV INTERNAL DIMENSION SYSTOLIC INDEX: 1.43 CM/M2
ECHO LV INTERNAL DIMENSION SYSTOLIC: 3.5 CM
ECHO LV IVSD: 1.1 CM (ref 0.6–1)
ECHO LV IVSS: 1.6 CM
ECHO LV MASS 2D: 199.6 G (ref 88–224)
ECHO LV MASS INDEX 2D: 81.5 G/M2 (ref 49–115)
ECHO LV POSTERIOR WALL DIASTOLIC: 1.2 CM (ref 0.6–1)
ECHO LV POSTERIOR WALL SYSTOLIC: 1.4 CM
ECHO LV RELATIVE WALL THICKNESS RATIO: 0.51
ECHO LVOT AREA: 4.5 CM2
ECHO LVOT AV VTI INDEX: 0.83
ECHO LVOT DIAM: 2.4 CM
ECHO LVOT MEAN GRADIENT: 2 MMHG
ECHO LVOT PEAK GRADIENT: 3 MMHG
ECHO LVOT PEAK VELOCITY: 0.9 M/S
ECHO LVOT STROKE VOLUME INDEX: 40 ML/M2
ECHO LVOT SV: 98.1 ML
ECHO LVOT VTI: 21.7 CM
ECHO MV A VELOCITY: 1 M/S
ECHO MV AREA VTI: 3.2 CM2
ECHO MV E DECELERATION TIME (DT): 305.3 MS
ECHO MV E VELOCITY: 0.88 M/S
ECHO MV E/A RATIO: 0.88
ECHO MV LVOT VTI INDEX: 1.41
ECHO MV MAX VELOCITY: 1 M/S
ECHO MV MEAN GRADIENT: 2 MMHG
ECHO MV MEAN VELOCITY: 0.7 M/S
ECHO MV PEAK GRADIENT: 4 MMHG
ECHO MV VTI: 30.7 CM
ECHO PVEIN PEAK D VELOCITY: 0.3 M/S
ECHO PVEIN PEAK S VELOCITY: 0.4 M/S
ECHO PVEIN S/D RATIO: 1.3
ECHO RV INTERNAL DIMENSION: 3.3 CM
EOSINOPHIL # BLD: 0.14 K/UL (ref 0.05–0.5)
EOSINOPHILS RELATIVE PERCENT: 2 % (ref 0–6)
ERYTHROCYTE [DISTWIDTH] IN BLOOD BY AUTOMATED COUNT: 13.2 % (ref 11.5–15)
GFR, ESTIMATED: 81 ML/MIN/1.73M2
GLUCOSE SERPL-MCNC: 103 MG/DL (ref 74–99)
HCT VFR BLD AUTO: 46 % (ref 37–54)
HGB BLD-MCNC: 16.3 G/DL (ref 12.5–16.5)
IMM GRANULOCYTES # BLD AUTO: 0.03 K/UL (ref 0–0.58)
IMM GRANULOCYTES NFR BLD: 0 % (ref 0–5)
LYMPHOCYTES NFR BLD: 1.34 K/UL (ref 1.5–4)
LYMPHOCYTES RELATIVE PERCENT: 19 % (ref 20–42)
MAGNESIUM SERPL-MCNC: 2 MG/DL (ref 1.6–2.6)
MCH RBC QN AUTO: 32.7 PG (ref 26–35)
MCHC RBC AUTO-ENTMCNC: 35.4 G/DL (ref 32–34.5)
MCV RBC AUTO: 92.2 FL (ref 80–99.9)
MONOCYTES NFR BLD: 0.72 K/UL (ref 0.1–0.95)
MONOCYTES NFR BLD: 10 % (ref 2–12)
NEUTROPHILS NFR BLD: 67 % (ref 43–80)
NEUTS SEG NFR BLD: 4.63 K/UL (ref 1.8–7.3)
PHOSPHATE SERPL-MCNC: 3.1 MG/DL (ref 2.5–4.5)
PLATELET # BLD AUTO: 240 K/UL (ref 130–450)
PMV BLD AUTO: 10.2 FL (ref 7–12)
POTASSIUM SERPL-SCNC: 3.9 MMOL/L (ref 3.5–5)
RBC # BLD AUTO: 4.99 M/UL (ref 3.8–5.8)
SODIUM SERPL-SCNC: 137 MMOL/L (ref 132–146)
WBC OTHER # BLD: 6.9 K/UL (ref 4.5–11.5)

## 2024-06-28 PROCEDURE — 6370000000 HC RX 637 (ALT 250 FOR IP): Performed by: NURSE PRACTITIONER

## 2024-06-28 PROCEDURE — 82330 ASSAY OF CALCIUM: CPT

## 2024-06-28 PROCEDURE — 70450 CT HEAD/BRAIN W/O DYE: CPT

## 2024-06-28 PROCEDURE — 93306 TTE W/DOPPLER COMPLETE: CPT | Performed by: INTERNAL MEDICINE

## 2024-06-28 PROCEDURE — 99291 CRITICAL CARE FIRST HOUR: CPT | Performed by: NURSE PRACTITIONER

## 2024-06-28 PROCEDURE — 97530 THERAPEUTIC ACTIVITIES: CPT

## 2024-06-28 PROCEDURE — 80048 BASIC METABOLIC PNL TOTAL CA: CPT

## 2024-06-28 PROCEDURE — 6360000002 HC RX W HCPCS: Performed by: STUDENT IN AN ORGANIZED HEALTH CARE EDUCATION/TRAINING PROGRAM

## 2024-06-28 PROCEDURE — 2060000000 HC ICU INTERMEDIATE R&B

## 2024-06-28 PROCEDURE — 83735 ASSAY OF MAGNESIUM: CPT

## 2024-06-28 PROCEDURE — 6370000000 HC RX 637 (ALT 250 FOR IP): Performed by: STUDENT IN AN ORGANIZED HEALTH CARE EDUCATION/TRAINING PROGRAM

## 2024-06-28 PROCEDURE — 85025 COMPLETE CBC W/AUTO DIFF WBC: CPT

## 2024-06-28 PROCEDURE — 97535 SELF CARE MNGMENT TRAINING: CPT

## 2024-06-28 PROCEDURE — 84100 ASSAY OF PHOSPHORUS: CPT

## 2024-06-28 RX ORDER — HYDRALAZINE HYDROCHLORIDE 20 MG/ML
10 INJECTION INTRAMUSCULAR; INTRAVENOUS EVERY 4 HOURS PRN
Status: DISCONTINUED | OUTPATIENT
Start: 2024-06-28 | End: 2024-07-01 | Stop reason: HOSPADM

## 2024-06-28 RX ORDER — LABETALOL HYDROCHLORIDE 5 MG/ML
10 INJECTION, SOLUTION INTRAVENOUS EVERY 4 HOURS PRN
Status: DISCONTINUED | OUTPATIENT
Start: 2024-06-28 | End: 2024-07-01 | Stop reason: HOSPADM

## 2024-06-28 RX ORDER — OXCARBAZEPINE 150 MG/1
600 TABLET, FILM COATED ORAL 2 TIMES DAILY
COMMUNITY

## 2024-06-28 RX ADMIN — ASPIRIN 325 MG: 325 TABLET, COATED ORAL at 07:23

## 2024-06-28 RX ADMIN — CLOPIDOGREL BISULFATE 75 MG: 75 TABLET ORAL at 07:23

## 2024-06-28 RX ADMIN — METOPROLOL TARTRATE 25 MG: 25 TABLET, FILM COATED ORAL at 07:23

## 2024-06-28 RX ADMIN — ENOXAPARIN SODIUM 30 MG: 100 INJECTION SUBCUTANEOUS at 09:28

## 2024-06-28 RX ADMIN — METOPROLOL TARTRATE 25 MG: 25 TABLET, FILM COATED ORAL at 19:33

## 2024-06-28 RX ADMIN — LOSARTAN POTASSIUM 50 MG: 50 TABLET, FILM COATED ORAL at 07:23

## 2024-06-28 RX ADMIN — ATORVASTATIN CALCIUM 40 MG: 40 TABLET, FILM COATED ORAL at 19:33

## 2024-06-28 ASSESSMENT — PAIN SCALES - GENERAL: PAINLEVEL_OUTOF10: 0

## 2024-06-28 NOTE — PROGRESS NOTES
procedure:  Right femoral.  No hematoma.      CONSTITUTIONAL: No acute distress, lying in hospital bed.    CARDIOVASCULAR: Monitor: NSR.  S1 S2.  Regular rate, regular rhythm.  .  No murmur/gallop/rub.  PULMONARY: Bilateral clear.   No rhonchi/rales/wheezes, no use of accessory muscles.  O2: Room air  RENAL:   Voids.  Fluid balance since admission:  - 384   ABDOMEN: Soft, nontender, nondistended, nontympanic, normal bowel sounds.   Diet:  Regular Low fat/low cholesterol/high fiber/2 G sodium.    Denies any nausea or vomiting.  Last BM:  PTA   MUSCULOSKELETAL: Moves all extremities purposefully  SKIN/EXTREMITIES: No rashes/ecchymosis, no edema/clubbing, warm/dry, good capillary refill.   LINES:   Left radial arterial line.  Day 1.  Good curve.    Peripheral    LABS:    Recent Labs     06/26/24  1401 06/27/24  0356 06/28/24  0417   WBC 7.4 9.9 6.9   HGB 17.2* 15.5 16.3   HCT 49.5 45.2 46.0   MCV 90.5 91.1 92.2    247 240     Recent Labs     06/26/24  1401 06/27/24  0356 06/28/24  0417    140 137   K 3.7 4.0 3.9   CO2 25 20* 22   PHOS  --  3.1 3.1   BUN 14 11 12   CREATININE 1.4* 1.1 1.1     Diagnostic imaging:  Reviewed.       ASSESSMENT & PLAN     Patient Active Problem List    Diagnosis Date Noted    Cerebrovascular accident (CVA) due to occlusion of right middle cerebral artery (HCC) 06/28/2024    Primary hypertension 06/27/2024    CVA (cerebrovascular accident due to intracerebral hemorrhage) ([facility]) 06/26/2024     Neuro:  Right MCA ischemic stroke in right parietal & occipital lobe.    S/P Thrombectomy  Monitor neuro status.    Interventional neurology following.    Stroke education.  Stroke protocol.    Speech therapy for cognitive evaluation.    CV:  Hypertension.  Hx of HTN    Monitor hemodynamics.    BP goal systolic less than 160 mm Hg.    MAP goal is greater than 65 mm Hg.    PRN hydralzine & labetalol.    Statin.    ASA.  Plavix.  Losartan.  Metoprolol  Pulm: No acute issues.    Monitor RR &

## 2024-06-28 NOTE — PLAN OF CARE
Problem: Discharge Planning  Goal: Discharge to home or other facility with appropriate resources  6/28/2024 1942 by Rosenda Diaz RN  Outcome: Progressing  6/28/2024 0823 by Zhang Jefferson RN  Outcome: Progressing     Problem: Skin/Tissue Integrity  Goal: Absence of new skin breakdown  Description: 1.  Monitor for areas of redness and/or skin breakdown  2.  Assess vascular access sites hourly  3.  Every 4-6 hours minimum:  Change oxygen saturation probe site  4.  Every 4-6 hours:  If on nasal continuous positive airway pressure, respiratory therapy assess nares and determine need for appliance change or resting period.  6/28/2024 1942 by Rosenda Diaz RN  Outcome: Progressing  6/28/2024 0823 by Zhang Jefferson RN  Outcome: Progressing     Problem: ABCDS Injury Assessment  Goal: Absence of physical injury  6/28/2024 1942 by Rosenda Diaz RN  Outcome: Progressing  Flowsheets (Taken 6/28/2024 1915)  Absence of Physical Injury: Implement safety measures based on patient assessment  6/28/2024 0823 by Zhang Jefferson RN  Outcome: Progressing     Problem: Neurosensory - Adult  Goal: Achieves stable or improved neurological status  6/28/2024 1942 by Rosenda Diaz RN  Outcome: Progressing  6/28/2024 0823 by Zhang Jefferson RN  Outcome: Progressing  Goal: Absence of seizures  6/28/2024 1942 by Rosenda Diaz RN  Outcome: Progressing  6/28/2024 0823 by Zhang Jefferson RN  Outcome: Progressing  Goal: Remains free of injury related to seizures activity  6/28/2024 1942 by Rosenda Diaz RN  Outcome: Progressing  6/28/2024 0823 by Zhang Jefferson RN  Outcome: Progressing  Goal: Achieves maximal functionality and self care  6/28/2024 1942 by Rosenda Diaz RN  Outcome: Progressing  6/28/2024 0823 by Zhang Jefferson RN  Outcome: Progressing     Problem: Cardiovascular - Adult  Goal: Maintains optimal cardiac output and hemodynamic stability  6/28/2024 1942 by Rosenda Diaz RN  Outcome: Progressing  6/28/2024 0823 by

## 2024-06-28 NOTE — PLAN OF CARE
Problem: Discharge Planning  Goal: Discharge to home or other facility with appropriate resources  6/28/2024 0823 by Zhang Jefferson RN  Outcome: Progressing  6/27/2024 2023 by Augusto Forman RN  Outcome: Progressing     Problem: Skin/Tissue Integrity  Goal: Absence of new skin breakdown  Description: 1.  Monitor for areas of redness and/or skin breakdown  2.  Assess vascular access sites hourly  3.  Every 4-6 hours minimum:  Change oxygen saturation probe site  4.  Every 4-6 hours:  If on nasal continuous positive airway pressure, respiratory therapy assess nares and determine need for appliance change or resting period.  6/28/2024 0823 by Zhang Jefferson RN  Outcome: Progressing  6/27/2024 2023 by Augusto Forman RN  Outcome: Progressing     Problem: ABCDS Injury Assessment  Goal: Absence of physical injury  6/28/2024 0823 by Zhang Jefferson RN  Outcome: Progressing  6/27/2024 2023 by Augusto Forman RN  Outcome: Progressing     Problem: Neurosensory - Adult  Goal: Achieves stable or improved neurological status  6/28/2024 0823 by Zhang Jefferson RN  Outcome: Progressing  6/27/2024 2023 by Augusto Forman RN  Outcome: Progressing  Goal: Absence of seizures  6/28/2024 0823 by Zhang Jefferson RN  Outcome: Progressing  6/27/2024 2023 by Augusto Forman RN  Outcome: Progressing  Goal: Remains free of injury related to seizures activity  6/28/2024 0823 by Zhang Jefferson RN  Outcome: Progressing  6/27/2024 2023 by Augusto Forman RN  Outcome: Progressing  Goal: Achieves maximal functionality and self care  6/28/2024 0823 by Zhang Jefferson RN  Outcome: Progressing  6/27/2024 2023 by Augusto Forman RN  Outcome: Progressing     Problem: Cardiovascular - Adult  Goal: Maintains optimal cardiac output and hemodynamic stability  6/28/2024 0823 by Zhang Jefferson RN  Outcome: Progressing  6/27/2024 2023 by Augusto Forman RN  Outcome: Progressing     Problem: Skin/Tissue Integrity - Adult  Goal:  Skin integrity remains intact  6/28/2024 0823 by Zhang Jefferson RN  Outcome: Progressing  6/27/2024 2023 by Augusto Forman RN  Outcome: Progressing  Goal: Incisions, wounds, or drain sites healing without S/S of infection  6/28/2024 0823 by Zhang Jefferson RN  Outcome: Progressing  6/27/2024 2023 by Augusto Forman RN  Outcome: Progressing  Goal: Oral mucous membranes remain intact  6/28/2024 0823 by Zhang Jefferson RN  Outcome: Progressing  6/27/2024 2023 by Augusto Forman RN  Outcome: Progressing     Problem: Musculoskeletal - Adult  Goal: Return mobility to safest level of function  6/28/2024 0823 by Zhang Jefferson RN  Outcome: Progressing  6/27/2024 2023 by Augusto Forman RN  Outcome: Progressing  Goal: Maintain proper alignment of affected body part  6/28/2024 0823 by Zhang Jefferson RN  Outcome: Progressing  6/27/2024 2023 by Augusto Forman RN  Outcome: Progressing  Goal: Return ADL status to a safe level of function  6/28/2024 0823 by Zhang Jefferson RN  Outcome: Progressing  6/27/2024 2023 by Augusto Forman RN  Outcome: Progressing     Problem: Gastrointestinal - Adult  Goal: Minimal or absence of nausea and vomiting  6/28/2024 0823 by Zhang Jefferson RN  Outcome: Progressing  6/27/2024 2023 by Augusto Forman RN  Outcome: Progressing  Goal: Maintains or returns to baseline bowel function  6/28/2024 0823 by Zhang Jefferson RN  Outcome: Progressing  6/27/2024 2023 by Augusto Forman RN  Outcome: Progressing  Goal: Maintains adequate nutritional intake  6/28/2024 0823 by Zhang Jefferson RN  Outcome: Progressing  6/27/2024 2023 by Augusto Forman RN  Outcome: Progressing     Problem: Genitourinary - Adult  Goal: Absence of urinary retention  6/28/2024 0823 by Zhang Jefferson RN  Outcome: Progressing  6/27/2024 2023 by Petroski, Augusto, RN  Outcome: Progressing     Problem: Metabolic/Fluid and Electrolytes - Adult  Goal: Electrolytes maintained within normal limits  6/28/2024

## 2024-06-28 NOTE — PROGRESS NOTES
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 He will likely be to high functioning by the time ARU has a bed available. Anticipate discharge home with home therapy vs out patient therapy. Will continue to follow until discharged.

## 2024-06-28 NOTE — PROGRESS NOTES
[facility]   Progress note      Subjective:    Sitting up in chair and neuro ICU setting, completely conversant and feels great  Denies any acute complaints    Objective:    /81   Pulse 70   Temp 98.1 °F (36.7 °C) (Oral)   Resp 16   Ht 1.88 m (6' 2\")   Wt 120.2 kg (265 lb)   SpO2 96%   BMI 34.02 kg/m²     In: 1687.8 [P.O.:1020; I.V.:667.8]  Out: 3400   In: 1687.8   Out: 3400 [Urine:3400]    General appearance: NAD, conversant  HEENT: AT/NC, MMM  Neck: FROM, supple  Lungs: Clear to auscultation  CV: RRR, no MRGs  Vasc: Radial pulses 2+  Abdomen: Soft, non-tender; no masses or HSM  Extremities: No peripheral edema or digital cyanosis  Skin: no rash, lesions or ulcers  Psych: Alert and oriented to person, place and time  Neuro: Alert and interactive     Recent Labs     06/26/24  1401 06/27/24  0356 06/28/24  0417   WBC 7.4 9.9 6.9   HGB 17.2* 15.5 16.3   HCT 49.5 45.2 46.0    247 240       Recent Labs     06/26/24  1401 06/27/24  0356 06/28/24  0417    140 137   K 3.7 4.0 3.9   CL 99 106 103   CO2 25 20* 22   BUN 14 11 12   CREATININE 1.4* 1.1 1.1   CALCIUM 9.3 8.9 8.8       Assessment:    Principal Problem:    CVA (cerebrovascular accident due to intracerebral hemorrhage) (HCC)  Active Problems:    Primary hypertension    Cerebrovascular accident (CVA) due to occlusion of right middle cerebral artery (HCC)  Resolved Problems:    * No resolved hospital problems. 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

## 2024-06-28 NOTE — PROGRESS NOTES
Treatment: OT treatment provided this date:  Balance retraining: Performed sitting/standing balance ex's with instruction to facilitate righting reactions with postural changes during ADLS.  Pt feeling unsteady throughout session.      ADL retraining: Instruction on adapted techniques to increase independence and safe reach during dressing/bathing activities.  Pt demonstrated G understanding.    Cognition/Language/Perception: Language/word finding/ memory and & perceptual activities throughout session to improve comprehension/ communication skills. Pt continues with anomia (improving) and severe alexia.     Delirium Prevention: Environmental and sensory modifications assessed and implemented to decrease ICU acquired delirium and to improve overall orientation, mentation and pt interaction with family/staff.      Line management and environmental modifications made prior to and end of session to ensure patient safety and to increase efficiency of session.   Skilled monitoring of HR, O2 saturation, blood pressure and patient's response to activity performed throughout session.      Comments: OK from RN to see patient. Upon arrival, patient resting in bed, agreeable to session.  Pt demo good tolerance with fair understanding of education/techniques.  At end of session, patient left seated in chair to increase activity tolerance. Chair alarm activated.   Call light within reach, all lines and tubes intact. Pt instructed on use of call light for assistance and fall prevention.Overall, pt presents with decreased activity tolerance, dynamic balance, functional mobility and aphasia limiting completion of ADLs and safe return home. Pt can benefit from continued skilled OT to increase safety, functional independence & quality of life.     Pt has made good progress towards set goals.   Continue with current plan of care       Time In:1020              Time Out: 1047         Total Treatment Time: 27      Treatment Charges:

## 2024-06-28 NOTE — PROGRESS NOTES
Physical Therapy  Physical Therapy Treatment Note    Name: Iain Husain  : 1970  MRN: [id number]      Date of Service: 2024    Evaluating PT:  Cruz Womack, PT, DPT [id number]    Room #:  4524/4524-A  Diagnosis:  CVA (cerebrovascular accident due to intracerebral hemorrhage) (HCC) [I61.9]  Cerebrovascular accident (CVA) due to occlusion of right middle cerebral artery (HCC) [I63.511]  PMHx/PSHx:    Past Medical History:   Diagnosis Date    Hypertension      Procedure/Surgery:   Emergent catheter-based cervical and cerebral angiogram with right MCA thrombectomy.   Precautions:  Falls, SPB <160, aphasia, vision, chair alarm   Equipment Needs:  TBD    SUBJECTIVE:    Pt lives with wife in a 1 story home with 1-2 step(s) to enter and 0 rail(s).      Pt ambulated with no AD PTA.    OBJECTIVE:   Initial Evaluation  Date: 24 Treatment  24 Short Term/ Long Term   Goals   AM-PAC 6 Clicks     Was pt agreeable to Eval/treatment? Yes  Yes     Does pt have pain? No c/o pain  No c/o pain     Bed Mobility  Rolling: NT  Supine to sit: Leonidas   Sit to supine: NT  Scooting: Leonidas  Rolling: NT  Supine to sit: SBA   Sit to supine: NT  Scooting: SBA Independent    Transfers Sit to stand: Leonidas  Stand to sit: Leonidas  Stand pivot: Leonidas with no device  Sit to stand: SBA  Stand to sit: SBA  Stand pivot: Leonidas with no device  Independent    Ambulation   75 feet x 2 reps with Leonidas with no device  80 feet x 2 reps with Leonidas with no device  >400 feet with Independent    Stair negotiation: ascended and descended NT NT >2 steps with 1 rail Mod Independent    ROM BUE:  Defer to OT note  BLE:  WFL     Strength BUE:  Defer to OT note  BLE:  4/5  Increase by 1/3 MMT grade   Balance Sitting EOB:  SBA  Dynamic Standing: Leonidas no device  Sitting EOB:  SBA  Dynamic Standing: Leonidas no device  Sitting EOB:  Independent   Dynamic Standing:  Independent      Pt is A & O x 4  CAM-ICU: NT  RASS: 0  Sensation:  No reported of

## 2024-06-29 LAB
ANION GAP SERPL CALCULATED.3IONS-SCNC: 16 MMOL/L (ref 7–16)
BASOPHILS # BLD: 0.04 K/UL (ref 0–0.2)
BASOPHILS NFR BLD: 1 % (ref 0–2)
BUN SERPL-MCNC: 13 MG/DL (ref 6–20)
CALCIUM SERPL-MCNC: 9.2 MG/DL (ref 8.6–10.2)
CHLORIDE SERPL-SCNC: 102 MMOL/L (ref 98–107)
CO2 SERPL-SCNC: 19 MMOL/L (ref 22–29)
CREAT SERPL-MCNC: 1.1 MG/DL (ref 0.7–1.2)
EOSINOPHIL # BLD: 0.12 K/UL (ref 0.05–0.5)
EOSINOPHILS RELATIVE PERCENT: 2 % (ref 0–6)
ERYTHROCYTE [DISTWIDTH] IN BLOOD BY AUTOMATED COUNT: 12.8 % (ref 11.5–15)
GFR, ESTIMATED: 81 ML/MIN/1.73M2
GLUCOSE SERPL-MCNC: 102 MG/DL (ref 74–99)
HCT VFR BLD AUTO: 47.2 % (ref 37–54)
HGB BLD-MCNC: 16.8 G/DL (ref 12.5–16.5)
IMM GRANULOCYTES # BLD AUTO: 0.03 K/UL (ref 0–0.58)
IMM GRANULOCYTES NFR BLD: 0 % (ref 0–5)
LYMPHOCYTES NFR BLD: 1.1 K/UL (ref 1.5–4)
LYMPHOCYTES RELATIVE PERCENT: 15 % (ref 20–42)
MAGNESIUM SERPL-MCNC: 2 MG/DL (ref 1.6–2.6)
MCH RBC QN AUTO: 32.7 PG (ref 26–35)
MCHC RBC AUTO-ENTMCNC: 35.6 G/DL (ref 32–34.5)
MCV RBC AUTO: 91.8 FL (ref 80–99.9)
MONOCYTES NFR BLD: 0.63 K/UL (ref 0.1–0.95)
MONOCYTES NFR BLD: 9 % (ref 2–12)
NEUTROPHILS NFR BLD: 73 % (ref 43–80)
NEUTS SEG NFR BLD: 5.28 K/UL (ref 1.8–7.3)
PLATELET # BLD AUTO: 229 K/UL (ref 130–450)
PMV BLD AUTO: 10.3 FL (ref 7–12)
POTASSIUM SERPL-SCNC: 4.1 MMOL/L (ref 3.5–5)
RBC # BLD AUTO: 5.14 M/UL (ref 3.8–5.8)
SODIUM SERPL-SCNC: 137 MMOL/L (ref 132–146)
WBC OTHER # BLD: 7.2 K/UL (ref 4.5–11.5)

## 2024-06-29 PROCEDURE — 83735 ASSAY OF MAGNESIUM: CPT

## 2024-06-29 PROCEDURE — 6370000000 HC RX 637 (ALT 250 FOR IP): Performed by: NURSE PRACTITIONER

## 2024-06-29 PROCEDURE — 6360000002 HC RX W HCPCS: Performed by: NURSE PRACTITIONER

## 2024-06-29 PROCEDURE — 2060000000 HC ICU INTERMEDIATE R&B

## 2024-06-29 PROCEDURE — 6370000000 HC RX 637 (ALT 250 FOR IP): Performed by: FAMILY MEDICINE

## 2024-06-29 PROCEDURE — 6370000000 HC RX 637 (ALT 250 FOR IP): Performed by: STUDENT IN AN ORGANIZED HEALTH CARE EDUCATION/TRAINING PROGRAM

## 2024-06-29 PROCEDURE — 80048 BASIC METABOLIC PNL TOTAL CA: CPT

## 2024-06-29 PROCEDURE — 85025 COMPLETE CBC W/AUTO DIFF WBC: CPT

## 2024-06-29 PROCEDURE — 36415 COLL VENOUS BLD VENIPUNCTURE: CPT

## 2024-06-29 RX ORDER — OXCARBAZEPINE 300 MG/1
600 TABLET, FILM COATED ORAL 2 TIMES DAILY
Status: DISCONTINUED | OUTPATIENT
Start: 2024-06-29 | End: 2024-07-01 | Stop reason: HOSPADM

## 2024-06-29 RX ORDER — HYDROXYZINE PAMOATE 25 MG/1
25 CAPSULE ORAL 4 TIMES DAILY PRN
Status: DISCONTINUED | OUTPATIENT
Start: 2024-06-29 | End: 2024-07-01 | Stop reason: HOSPADM

## 2024-06-29 RX ADMIN — METOPROLOL TARTRATE 25 MG: 25 TABLET, FILM COATED ORAL at 20:15

## 2024-06-29 RX ADMIN — HYDROXYZINE PAMOATE 25 MG: 25 CAPSULE ORAL at 21:41

## 2024-06-29 RX ADMIN — OXCARBAZEPINE 600 MG: 300 TABLET, FILM COATED ORAL at 14:34

## 2024-06-29 RX ADMIN — HYDRALAZINE HYDROCHLORIDE 10 MG: 20 INJECTION INTRAMUSCULAR; INTRAVENOUS at 17:01

## 2024-06-29 RX ADMIN — HYDRALAZINE HYDROCHLORIDE 10 MG: 20 INJECTION INTRAMUSCULAR; INTRAVENOUS at 10:05

## 2024-06-29 RX ADMIN — HYDROXYZINE PAMOATE 25 MG: 25 CAPSULE ORAL at 14:34

## 2024-06-29 RX ADMIN — LOSARTAN POTASSIUM 50 MG: 50 TABLET, FILM COATED ORAL at 08:43

## 2024-06-29 RX ADMIN — METOPROLOL TARTRATE 25 MG: 25 TABLET, FILM COATED ORAL at 08:43

## 2024-06-29 RX ADMIN — ASPIRIN 325 MG: 325 TABLET, COATED ORAL at 08:43

## 2024-06-29 RX ADMIN — CLOPIDOGREL BISULFATE 75 MG: 75 TABLET ORAL at 08:43

## 2024-06-29 RX ADMIN — LABETALOL HYDROCHLORIDE 10 MG: 5 INJECTION, SOLUTION INTRAVENOUS at 23:44

## 2024-06-29 RX ADMIN — OXCARBAZEPINE 600 MG: 300 TABLET, FILM COATED ORAL at 20:15

## 2024-06-29 RX ADMIN — ATORVASTATIN CALCIUM 40 MG: 40 TABLET, FILM COATED ORAL at 20:15

## 2024-06-29 NOTE — PROGRESS NOTES
[facility]   Progress note      Subjective:    Patient is awake and alert lying in bed.  Family at bedside.  Patient a little quite weepy over situation.  Patient currently denies any chest pain, shortness of breath.  No acute events overnight.    Objective:    /89   Pulse 71   Temp 98.2 °F (36.8 °C) (Oral)   Resp 18   Ht 1.88 m (6' 2\")   Wt 120.2 kg (265 lb)   SpO2 98%   BMI 34.02 kg/m²     In: 1440 [P.O.:1440]  Out: 400   In: 1440   Out: 400 [Urine:400]    General appearance: NAD, conversant  HEENT: AT/NC, MMM  Neck: FROM, supple  Lungs: Clear to auscultation  CV: RRR, no MRGs  Vasc: Radial pulses 2+  Abdomen: Soft, non-tender; no masses or HSM  Extremities: No peripheral edema or digital cyanosis  Skin: no rash, lesions or ulcers  Psych: Alert and oriented to person, place and time  Neuro: Alert and interactive     Recent Labs     06/27/24  0356 06/28/24  0417 06/29/24  0547   WBC 9.9 6.9 7.2   HGB 15.5 16.3 16.8*   HCT 45.2 46.0 47.2    240 229       Recent Labs     06/27/24  0356 06/28/24  0417 06/29/24  0547    137 137   K 4.0 3.9 4.1    103 102   CO2 20* 22 19*   BUN 11 12 13   CREATININE 1.1 1.1 1.1   CALCIUM 8.9 8.8 9.2       Assessment:    Principal Problem:    CVA (cerebrovascular accident due to intracerebral hemorrhage) (HCC)  Active Problems:    Primary hypertension    Cerebrovascular accident (CVA) due to occlusion of right middle cerebral artery (HCC)  Resolved Problems:    * No resolved hospital problems. 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

## 2024-06-30 LAB
ANION GAP SERPL CALCULATED.3IONS-SCNC: 12 MMOL/L (ref 7–16)
BASOPHILS # BLD: 0.04 K/UL (ref 0–0.2)
BASOPHILS NFR BLD: 1 % (ref 0–2)
BUN SERPL-MCNC: 16 MG/DL (ref 6–20)
CALCIUM SERPL-MCNC: 8.9 MG/DL (ref 8.6–10.2)
CHLORIDE SERPL-SCNC: 102 MMOL/L (ref 98–107)
CO2 SERPL-SCNC: 21 MMOL/L (ref 22–29)
CREAT SERPL-MCNC: 1.1 MG/DL (ref 0.7–1.2)
EOSINOPHIL # BLD: 0.17 K/UL (ref 0.05–0.5)
EOSINOPHILS RELATIVE PERCENT: 3 % (ref 0–6)
ERYTHROCYTE [DISTWIDTH] IN BLOOD BY AUTOMATED COUNT: 13.2 % (ref 11.5–15)
GFR, ESTIMATED: 80 ML/MIN/1.73M2
GLUCOSE SERPL-MCNC: 89 MG/DL (ref 74–99)
HCT VFR BLD AUTO: 46.5 % (ref 37–54)
HGB BLD-MCNC: 15.8 G/DL (ref 12.5–16.5)
IMM GRANULOCYTES # BLD AUTO: <0.03 K/UL (ref 0–0.58)
IMM GRANULOCYTES NFR BLD: 0 % (ref 0–5)
LYMPHOCYTES NFR BLD: 1.72 K/UL (ref 1.5–4)
LYMPHOCYTES RELATIVE PERCENT: 26 % (ref 20–42)
MAGNESIUM SERPL-MCNC: 2 MG/DL (ref 1.6–2.6)
MCH RBC QN AUTO: 31.9 PG (ref 26–35)
MCHC RBC AUTO-ENTMCNC: 34 G/DL (ref 32–34.5)
MCV RBC AUTO: 93.8 FL (ref 80–99.9)
MONOCYTES NFR BLD: 0.97 K/UL (ref 0.1–0.95)
MONOCYTES NFR BLD: 15 % (ref 2–12)
NEUTROPHILS NFR BLD: 55 % (ref 43–80)
NEUTS SEG NFR BLD: 3.59 K/UL (ref 1.8–7.3)
PLATELET # BLD AUTO: 223 K/UL (ref 130–450)
PMV BLD AUTO: 10.4 FL (ref 7–12)
POTASSIUM SERPL-SCNC: 3.8 MMOL/L (ref 3.5–5)
RBC # BLD AUTO: 4.96 M/UL (ref 3.8–5.8)
SODIUM SERPL-SCNC: 135 MMOL/L (ref 132–146)
WBC OTHER # BLD: 6.5 K/UL (ref 4.5–11.5)

## 2024-06-30 PROCEDURE — 2060000000 HC ICU INTERMEDIATE R&B

## 2024-06-30 PROCEDURE — 6370000000 HC RX 637 (ALT 250 FOR IP): Performed by: NURSE PRACTITIONER

## 2024-06-30 PROCEDURE — 80048 BASIC METABOLIC PNL TOTAL CA: CPT

## 2024-06-30 PROCEDURE — 6370000000 HC RX 637 (ALT 250 FOR IP): Performed by: STUDENT IN AN ORGANIZED HEALTH CARE EDUCATION/TRAINING PROGRAM

## 2024-06-30 PROCEDURE — 6370000000 HC RX 637 (ALT 250 FOR IP): Performed by: FAMILY MEDICINE

## 2024-06-30 PROCEDURE — 36415 COLL VENOUS BLD VENIPUNCTURE: CPT

## 2024-06-30 PROCEDURE — 85025 COMPLETE CBC W/AUTO DIFF WBC: CPT

## 2024-06-30 PROCEDURE — 83735 ASSAY OF MAGNESIUM: CPT

## 2024-06-30 RX ADMIN — LOSARTAN POTASSIUM 50 MG: 50 TABLET, FILM COATED ORAL at 09:05

## 2024-06-30 RX ADMIN — ASPIRIN 325 MG: 325 TABLET, COATED ORAL at 09:05

## 2024-06-30 RX ADMIN — OXCARBAZEPINE 600 MG: 300 TABLET, FILM COATED ORAL at 20:20

## 2024-06-30 RX ADMIN — METOPROLOL TARTRATE 25 MG: 25 TABLET, FILM COATED ORAL at 20:21

## 2024-06-30 RX ADMIN — OXCARBAZEPINE 600 MG: 300 TABLET, FILM COATED ORAL at 09:06

## 2024-06-30 RX ADMIN — CLOPIDOGREL BISULFATE 75 MG: 75 TABLET ORAL at 09:04

## 2024-06-30 RX ADMIN — ATORVASTATIN CALCIUM 40 MG: 40 TABLET, FILM COATED ORAL at 20:21

## 2024-06-30 RX ADMIN — METOPROLOL TARTRATE 25 MG: 25 TABLET, FILM COATED ORAL at 09:03

## 2024-06-30 ASSESSMENT — PAIN SCALES - GENERAL
PAINLEVEL_OUTOF10: 0

## 2024-06-30 NOTE — PROGRESS NOTES
[facility]   Progress note      Subjective:    Patient is awake and alert lying in bed.   Patient currently denies any chest pain, shortness of breath.  No acute events overnight.    Objective:    /84   Pulse 68   Temp 98.1 °F (36.7 °C) (Oral)   Resp 16   Ht 1.88 m (6' 2\")   Wt 120.2 kg (265 lb)   SpO2 98%   BMI 34.02 kg/m²     In: 1170 [P.O.:1170]  Out: -   In: 1170   Out: -     General appearance: NAD, conversant, pleasant  HEENT: AT/NC, MMM  Neck: FROM, supple  Lungs: Clear to auscultation  CV: RRR, no MRGs  Vasc: Radial pulses 2+  Abdomen: Soft, non-tender; no masses or HSM  Extremities: No peripheral edema or digital cyanosis  Skin: no rash, lesions or ulcers  Psych: Alert and oriented to person, place and time  Neuro: Alert and interactive     Recent Labs     06/28/24  0417 06/29/24  0547 06/30/24  0617   WBC 6.9 7.2 6.5   HGB 16.3 16.8* 15.8   HCT 46.0 47.2 46.5    229 223       Recent Labs     06/28/24  0417 06/29/24  0547 06/30/24  0617    137 135   K 3.9 4.1 3.8    102 102   CO2 22 19* 21*   BUN 12 13 16   CREATININE 1.1 1.1 1.1   CALCIUM 8.8 9.2 8.9       Assessment:    Principal Problem:    CVA (cerebrovascular accident due to intracerebral hemorrhage) (HCC)  Active Problems:    Primary hypertension    Cerebrovascular accident (CVA) due to occlusion of right middle cerebral artery (HCC)  Resolved Problems:    * No resolved hospital problems. 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

## 2024-06-30 NOTE — PLAN OF CARE
Problem: Discharge Planning  Goal: Discharge to home or other facility with appropriate resources  Outcome: Progressing     Problem: Skin/Tissue Integrity  Goal: Absence of new skin breakdown  Description: 1.  Monitor for areas of redness and/or skin breakdown  2.  Assess vascular access sites hourly  3.  Every 4-6 hours minimum:  Change oxygen saturation probe site  4.  Every 4-6 hours:  If on nasal continuous positive airway pressure, respiratory therapy assess nares and determine need for appliance change or resting period.  Outcome: Progressing     Problem: ABCDS Injury Assessment  Goal: Absence of physical injury  Outcome: Progressing  Flowsheets (Taken 6/29/2024 2000)  Absence of Physical Injury: Implement safety measures based on patient assessment     Problem: Neurosensory - Adult  Goal: Achieves stable or improved neurological status  Outcome: Progressing  Goal: Absence of seizures  Outcome: Progressing  Goal: Remains free of injury related to seizures activity  Outcome: Progressing  Goal: Achieves maximal functionality and self care  Outcome: Progressing     Problem: Cardiovascular - Adult  Goal: Maintains optimal cardiac output and hemodynamic stability  Outcome: Progressing     Problem: Skin/Tissue Integrity - Adult  Goal: Skin integrity remains intact  Outcome: Progressing  Flowsheets (Taken 6/29/2024 2000)  Skin Integrity Remains Intact: Monitor for areas of redness and/or skin breakdown  Goal: Incisions, wounds, or drain sites healing without S/S of infection  Outcome: Progressing  Goal: Oral mucous membranes remain intact  Outcome: Progressing     Problem: Musculoskeletal - Adult  Goal: Return mobility to safest level of function  Outcome: Progressing  Goal: Maintain proper alignment of affected body part  Outcome: Progressing  Goal: Return ADL status to a safe level of function  Outcome: Progressing     Problem: Gastrointestinal - Adult  Goal: Minimal or absence of nausea and vomiting  Outcome:

## 2024-06-30 NOTE — PROGRESS NOTES
I have reviewed the head CT from this morning.  There is a stable area of infarct in the right hemisphere.  There is a very small area of convexity subarachnoid hemorrhage along the posterior margin of right parietal lobe.  This is clinically insignificant.    Patient can be moved out from neuro ICU from my standpoint.  Continue dual antiplatelet therapy with aspirin 325 mg and Plavix 75 mg daily.  Additionally continue Lipitor 40 mg daily.  Systolic blood pressure goal less than 140 mmHg in the long run.  The echocardiogram shows EF of 55 to 60% with normal left atrium size and no evidence of PFO.    The etiology of stroke is most likely acute plaque rupture in the right MCA M2 branch in the setting of underlying intracranial atherosclerotic disease.    Mobilize.  Disposition planning.  Aggressive therapy.  He will benefit from occupational and speech therapy.  He also needs to be very compliant with his CPAP.  He cannot drive until he sees me in the clinic in 8 weeks for follow-up.  I will address his intracranial right vertebral artery aneurysm as an outpatient.    Discussed at length with the patient and multiple family members by the bedside.  I will officially sign off.  Please call me with any additional questions/concerns.  We will arrange a follow-up for the patient to be seen in the clinic in 8 weeks.    Bello Chowdhury M.D.  Vascular Neurology & Neurointerventional Surgery

## 2024-07-01 VITALS
RESPIRATION RATE: 20 BRPM | HEIGHT: 74 IN | BODY MASS INDEX: 34.01 KG/M2 | SYSTOLIC BLOOD PRESSURE: 130 MMHG | TEMPERATURE: 97.5 F | OXYGEN SATURATION: 94 % | DIASTOLIC BLOOD PRESSURE: 103 MMHG | WEIGHT: 265 LBS | HEART RATE: 71 BPM

## 2024-07-01 LAB
ANION GAP SERPL CALCULATED.3IONS-SCNC: 14 MMOL/L (ref 7–16)
BASOPHILS # BLD: 0.05 K/UL (ref 0–0.2)
BASOPHILS NFR BLD: 1 % (ref 0–2)
BUN SERPL-MCNC: 20 MG/DL (ref 6–20)
CALCIUM SERPL-MCNC: 9.2 MG/DL (ref 8.6–10.2)
CHLORIDE SERPL-SCNC: 102 MMOL/L (ref 98–107)
CO2 SERPL-SCNC: 18 MMOL/L (ref 22–29)
CREAT SERPL-MCNC: 1.1 MG/DL (ref 0.7–1.2)
EOSINOPHIL # BLD: 0.28 K/UL (ref 0.05–0.5)
EOSINOPHILS RELATIVE PERCENT: 3 % (ref 0–6)
ERYTHROCYTE [DISTWIDTH] IN BLOOD BY AUTOMATED COUNT: 13.1 % (ref 11.5–15)
GFR, ESTIMATED: 82 ML/MIN/1.73M2
GLUCOSE SERPL-MCNC: 99 MG/DL (ref 74–99)
HCT VFR BLD AUTO: 45.5 % (ref 37–54)
HGB BLD-MCNC: 15.9 G/DL (ref 12.5–16.5)
IMM GRANULOCYTES # BLD AUTO: 0.04 K/UL (ref 0–0.58)
IMM GRANULOCYTES NFR BLD: 1 % (ref 0–5)
LYMPHOCYTES NFR BLD: 2.13 K/UL (ref 1.5–4)
LYMPHOCYTES RELATIVE PERCENT: 25 % (ref 20–42)
MAGNESIUM SERPL-MCNC: 2.2 MG/DL (ref 1.6–2.6)
MCH RBC QN AUTO: 31.4 PG (ref 26–35)
MCHC RBC AUTO-ENTMCNC: 34.9 G/DL (ref 32–34.5)
MCV RBC AUTO: 89.9 FL (ref 80–99.9)
MONOCYTES NFR BLD: 0.92 K/UL (ref 0.1–0.95)
MONOCYTES NFR BLD: 11 % (ref 2–12)
NEUTROPHILS NFR BLD: 61 % (ref 43–80)
NEUTS SEG NFR BLD: 5.24 K/UL (ref 1.8–7.3)
PLATELET # BLD AUTO: 259 K/UL (ref 130–450)
PMV BLD AUTO: 10.3 FL (ref 7–12)
POTASSIUM SERPL-SCNC: 4 MMOL/L (ref 3.5–5)
RBC # BLD AUTO: 5.06 M/UL (ref 3.8–5.8)
SODIUM SERPL-SCNC: 134 MMOL/L (ref 132–146)
WBC OTHER # BLD: 8.7 K/UL (ref 4.5–11.5)

## 2024-07-01 PROCEDURE — 80048 BASIC METABOLIC PNL TOTAL CA: CPT

## 2024-07-01 PROCEDURE — 6370000000 HC RX 637 (ALT 250 FOR IP): Performed by: FAMILY MEDICINE

## 2024-07-01 PROCEDURE — 6370000000 HC RX 637 (ALT 250 FOR IP): Performed by: NURSE PRACTITIONER

## 2024-07-01 PROCEDURE — 36415 COLL VENOUS BLD VENIPUNCTURE: CPT

## 2024-07-01 PROCEDURE — 6370000000 HC RX 637 (ALT 250 FOR IP): Performed by: STUDENT IN AN ORGANIZED HEALTH CARE EDUCATION/TRAINING PROGRAM

## 2024-07-01 PROCEDURE — 85025 COMPLETE CBC W/AUTO DIFF WBC: CPT

## 2024-07-01 PROCEDURE — 83735 ASSAY OF MAGNESIUM: CPT

## 2024-07-01 RX ORDER — ATORVASTATIN CALCIUM 40 MG/1
40 TABLET, FILM COATED ORAL NIGHTLY
Qty: 30 TABLET | Refills: 3 | Status: SHIPPED | OUTPATIENT
Start: 2024-07-01

## 2024-07-01 RX ORDER — ASPIRIN 325 MG
325 TABLET, DELAYED RELEASE (ENTERIC COATED) ORAL DAILY
Qty: 30 TABLET | Refills: 3 | Status: SHIPPED | OUTPATIENT
Start: 2024-07-02

## 2024-07-01 RX ORDER — CLOPIDOGREL BISULFATE 75 MG/1
75 TABLET ORAL DAILY
Qty: 30 TABLET | Refills: 3 | Status: SHIPPED | OUTPATIENT
Start: 2024-07-02

## 2024-07-01 RX ADMIN — CLOPIDOGREL BISULFATE 75 MG: 75 TABLET ORAL at 09:37

## 2024-07-01 RX ADMIN — OXCARBAZEPINE 600 MG: 300 TABLET, FILM COATED ORAL at 09:38

## 2024-07-01 RX ADMIN — LOSARTAN POTASSIUM 50 MG: 50 TABLET, FILM COATED ORAL at 09:36

## 2024-07-01 RX ADMIN — ASPIRIN 325 MG: 325 TABLET, COATED ORAL at 09:38

## 2024-07-01 RX ADMIN — METOPROLOL TARTRATE 25 MG: 25 TABLET, FILM COATED ORAL at 09:36

## 2024-07-01 ASSESSMENT — PAIN SCALES - GENERAL: PAINLEVEL_OUTOF10: 0

## 2024-07-01 NOTE — CARE COORDINATION
Consult received. Dr. Jose to [facility] for ARU.  
Discharge order noted. Per prior sw notes- Patient ambulated 180' no assistive device. Patient would prefer outpatient therapy. utpatient therapy orders obtained for speech, PT/OT. Printed copy provided to spouse Met with patient today - asked that I speak with his wife who just left to get his meds.Electronically signed by Rosario Singh RN on 7/1/2024 at 12:45 PM    Met with wife and she was asking regarding somethng for anxiety for pt spoke with bedside RN  has prn vistaril here.Follow up with PCP.Electronically signed by Rosario Singh RN on 7/1/2024 at 1:09 PM    
Spoke with patient with spouse at bedside. Patient ambulated 180' today no assistive device. Discussed homecare vs. Outpatient therapy. Patient would prefer outpatient therapy. List of area providers given to spouse. Will need orders for outpatient therapy prior to discharge. Spouse to transport home.     3608 Outpatient therapy orders obtained for speech, PT/OT. Printed copy provided to spouse.     For questions I can be reached at [phone]. DEXTER Mujica      
community resources:    Current services prior to admission: C-pap            Current DME:              Type of Home Care services:  None    ADLS  Prior functional level: Independent in ADLs/IADLs  Current functional level: Assistance with the following:, Mobility, Shopping, Housework    PT AM-PAC: 16 /24  OT AM-PAC: 16 /24    Family can provide assistance at DC: Yes  Would you like Case Management to discuss the discharge plan with any other family members/significant others, and if so, who? Yes (spouse)  Plans to Return to Present Housing: Yes  Other Identified Issues/Barriers to RETURNING to current housing:   Potential Assistance needed at discharge: Other (Comment) (TBD)            Potential DME:    Patient expects to discharge to: House  Plan for transportation at discharge:      Financial    Payor: [employer] / Plan: [employer] [address] / Product Type: *No Product type* /     Does insurance require precert for SNF: Yes    Potential assistance Purchasing Medications:    Meds-to-Beds request:        [facility] [place] [address] [address] [facility] [phone] -  [phone]  [address] [place] [address]  Phone: [phone] Fax: [phone]      Notes:    Factors facilitating achievement of predicted outcomes: Family support, Motivated, and Cooperative    Barriers to discharge: Stairs at home    Additional Case Management Notes:     The Plan for Transition of Care is related to the following treatment goals of CVA (cerebrovascular accident due to intracerebral hemorrhage) (HCC) [I61.9]  Cerebrovascular accident (CVA) due to occlusion of right middle cerebral artery (HCC) [I63.511]    IF APPLICABLE: The Patient and/or patient representative Iain and his family were provided with a choice of provider and agrees with the discharge plan. Freedom of choice list with basic dialogue that supports the patient's individualized plan of care/goals and shares the quality data associated

## 2024-07-01 NOTE — DISCHARGE SUMMARY
1. Equally dominant trifurcate right MCA M2 branching pattern. There is layering of subocclusive thrombus in 1 of these M2 branches supplying the right temporoparietal region with significant capillary perfusion deficit. This is now status post mechanical thrombectomy x1 attempt using combined stent retriever and distal aspiration technique with complete recanalization of the right MCA vasculature. 2. Residual, less than 50% stenosis of the right MCA M2 branch (which had subocclusive thrombus) at the end of thrombectomy. 3. Intraoperative Patti CT head with no evidence of hemorrhage in the right hemisphere. 4. Combined fusiform and saccular aneurysmal dilatation of the right vertebral artery intracranial V4 segment measuring approximately 10 mm in greatest dimension. Dolichoectatic appearance of the vertebrobasilar system. PLAN: The patient will be sent to the neuro ICU unit with groin, vascular, and neuro checks. He will be given gentle IV fluid hydration for contrast washout. He is now status post successful right MCA thrombectomy. I will obtain MRI brain and accordingly start the patient on second antiplatelet agent for the presumed etiology being acute plaque rupture in the setting of intracranial atherosclerotic disease. Once he recovers from this stroke, I will study his right vertebral artery aneurysm in detail which will likely need management. Family was updated. Electronically signed by Bello MARKS CARLOS CATH PLACE VERTEBRAL ART RIGHT W ANGIO    Result Date: 6/30/2024  OPERATING SURGEON:  Bello Chowdhury M.D. ASSISTANT: KEANU Garcia. PRE-PROCEDURE DIAGNOSIS: High-grade right MCA M2 stenosis versus mid to distal M2 occlusion. POST-PROCEDURE DIAGNOSIS: Subocclusive thrombus in right MCA M2 branch. PROCEDURE PERFORMED: Emergent catheter-based cervical and cerebral angiogram with right MCA thrombectomy. CONSENT: Written informed consent obtained from the patient's family. 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 SOFT TISSUES: The lung apices are clear.  No cervical or superior mediastinal lymphadenopathy.  The larynx and pharynx are unremarkable.  No acute abnormality of the salivary and thyroid glands. BONES: No acute osseous abnormality. CTA HEAD: ANTERIOR CIRCULATION: Focal high-grade stenosis is seen in the M2 segment of the right middle cerebral artery (series 3, image 208; sagittal reformats, series 606, image 47). No significant stenosis is identified in the internal carotid arteries, anterior cerebral arteries or the left middle cerebral artery. POSTERIOR CIRCULATION: The basilar and the right vertebral artery are markedly tortuous.  Along the posterior aspect of the distal right vertebral artery, there is a 1.2 x 0.4 cm saccular aneurysm (series 3, image 183).  The leftward deviation of the tortuous basilar artery is in close proximity to the left internal auditory canal likely impinging the left 7th and 8th cranial nerves. OTHER: No dural venous sinus thrombosis on this non-dedicated study. BRAIN: No mass effect or midline shift. No extra-axial fluid collection. The gray-white differentiation is maintained. CT PERFUSION: EXAM QUALITY: The examination is diagnostic with appropriate arterial inflow and venous outflow curves, and diagnostic perfusion maps. CORE INFARCT: The total area of ischemic core is 12 mL (CBF<30% volume). TOTAL HYPOPERFUSION: The total area of hypoperfusion is 81 mL (Tmax>6s volume). PENUMBRA: The penumbra (mismatch) volume is 69 mL and is located in the right posterior cerebral hemisphere, involving the right parietal, temporal and occipital lobes.  The mismatch ratio is 6.8.     CTA NECK: No dissection or hemodynamically significant stenosis of the major arteries of the neck. CTA HEAD: 1. Focal HIGH-GRADE STENOSIS in the M2 segment of the right middle cerebral artery. 2. 1.2 x 0.4 cm saccular aneurysm along the posterior aspect of the

## 2024-07-01 NOTE — DISCHARGE INSTRUCTIONS
No driving for 8 weeks until evaluated by Interventional Neurology  Follow-up with Interventional Neurology in 8 weeks

## 2024-07-01 NOTE — PLAN OF CARE
Problem: Discharge Planning  Goal: Discharge to home or other facility with appropriate resources  Outcome: Progressing     Problem: Skin/Tissue Integrity  Goal: Absence of new skin breakdown  Description: 1.  Monitor for areas of redness and/or skin breakdown  2.  Assess vascular access sites hourly  3.  Every 4-6 hours minimum:  Change oxygen saturation probe site  4.  Every 4-6 hours:  If on nasal continuous positive airway pressure, respiratory therapy assess nares and determine need for appliance change or resting period.  Outcome: Progressing     Problem: ABCDS Injury Assessment  Goal: Absence of physical injury  Outcome: Progressing  Flowsheets (Taken 6/30/2024 2015)  Absence of Physical Injury: Implement safety measures based on patient assessment     Problem: Neurosensory - Adult  Goal: Achieves stable or improved neurological status  Outcome: Progressing  Goal: Absence of seizures  Outcome: Progressing  Goal: Remains free of injury related to seizures activity  Outcome: Progressing  Goal: Achieves maximal functionality and self care  Outcome: Progressing     Problem: Cardiovascular - Adult  Goal: Maintains optimal cardiac output and hemodynamic stability  Outcome: Progressing     Problem: Skin/Tissue Integrity - Adult  Goal: Skin integrity remains intact  Outcome: Progressing  Flowsheets (Taken 6/30/2024 2015)  Skin Integrity Remains Intact: Monitor for areas of redness and/or skin breakdown  Goal: Incisions, wounds, or drain sites healing without S/S of infection  Outcome: Progressing  Goal: Oral mucous membranes remain intact  Outcome: Progressing     Problem: Musculoskeletal - Adult  Goal: Return mobility to safest level of function  Outcome: Progressing  Goal: Maintain proper alignment of affected body part  Outcome: Progressing  Goal: Return ADL status to a safe level of function  Outcome: Progressing     Problem: Gastrointestinal - Adult  Goal: Minimal or absence of nausea and vomiting  Outcome:

## 2024-07-01 NOTE — PROGRESS NOTES
CLINICAL PHARMACY NOTE: MEDS TO BEDS    Total # of Prescriptions Filled: 3   The following medications were delivered to the patient:  Aspirin 325 mg  Clopidogrel 75 mg  Atorvastatin 40 mg    Additional Documentation:   Pt wife picked up in pharmacy

## 2024-07-09 ENCOUNTER — HOSPITAL ENCOUNTER (OUTPATIENT)
Dept: PHYSICAL THERAPY | Age: 54
Setting detail: THERAPIES SERIES
Discharge: HOME | End: 2024-07-09
Payer: COMMERCIAL

## 2024-07-09 ENCOUNTER — HOSPITAL ENCOUNTER (OUTPATIENT)
Dept: OCCUPATIONAL THERAPY | Age: 54
Setting detail: THERAPIES SERIES
Discharge: HOME | End: 2024-07-09
Payer: COMMERCIAL

## 2024-07-09 PROCEDURE — 97165 OT EVAL LOW COMPLEX 30 MIN: CPT

## 2024-07-09 PROCEDURE — 97161 PT EVAL LOW COMPLEX 20 MIN: CPT

## 2024-07-09 NOTE — PROGRESS NOTES
Physical Therapy  Facility/Department: [facility] PHYSICAL THERAPY  Physical Therapy Initial Assessment    Name: Iain Husain  : 1970  MRN: [id number]  Date of Service: 2024    Discharge Recommendations:             Patient Diagnosis(es): There were no encounter diagnoses.  Past Medical History:  has a past medical history of Hypertension.  Past Surgical History:  has a past surgical history that includes IR MECHANICAL ART THROMBECTOMY INTRACRANIAL (2024).    Assessment   Assessment: Patient demonstrates no significant deficits of ROM or strength which would require PT intervention at this time  Activity Tolerance  Activity Tolerance: Patient tolerated evaluation without incident     Plan   Physical Therapy Plan  Additional Comments: Patient does not require further intervention from PT at present time     Restrictions        Subjective   General  Additional Pertinent Hx: Patient presents to PT to assess and treat issues related to an acute Neurological incident. Patient was admitted on 24 with acute cognitive/specch dysfunction Dx with a thrombetic CVA of the right Middle Cerebral artery  Diagnosis: CVA         Social/Functional History  Social/Functional History  Lives With: Family  Type of Home: House  Home Layout: Two level, Work area in basement  Receives Help From: Family  Homemaking Responsibilities: Yes  Active : No  Occupation: Full time employment, Off due to injury  Vision/Hearing       Cognition   Cognition  Patient affect:: Normal     Objective                     Strength RLE  Strength RLE: WFL  Strength LLE  Strength LLE: WFL  Tone RLE  RLE Tone: Normotonic  Tone LLE  LLE Tone: Normotonic  Motor Control  Gross Motor?: WFL        Bed mobility  Bridging: Independent  Rolling to Left: Independent  Rolling to Right: Independent  Supine to Sit: Independent  Sit to Supine: Independent  Transfers  Sit to Stand: Independent  Stand to Sit: Independent  Ambulation  Surface: Level

## 2024-07-09 NOTE — PLAN OF CARE
[facility] PHYSICAL THERAPY  [address] [facility] [address]  Dept: [phone]  Loc: [phone]    PHYSICAL THERAPY PLAN OF CARE: INITIAL EVALUATION    Patient: Iain Husain (53 y.o. male)   Examination Date: 2024  Plan of Care Certification Period: 2024 to        :  1970  MRN: [id number]  CSN: [id number]   Insurance: Payor: [employer] / Plan: [employer] [address] / Product Type: *No Product type* /   Insurance ID: [id number] - (Commercial) Secondary Insurance (if applicable):    Referring Physician: Paulette Howard MD     PCP: Girma Kay MD Visits to Date/Visits Approved:   /      No Show/Cancelled Appts:   /       Medical Diagnosis: Cerebral infarction due to unspecified occlusion or stenosis of right middle cerebral artery [I63.511] CVA  No data recorded     SUBJECTIVE EXAMINATION      History obtained from:: Patient, Chart Review, (s),  (s): Spouse   Family/Caregiver Present: Yes     Subjective History: Onset Date: 24  Subjective: Patient cites deficits affecting eyesite and speech but reports no major issues with strength and coordination at present Some issues with endurance/fatigue  Additional Pertinent Hx (if applicable): Patient presents to PT to assess and treat issues related to an acute Neurological incident. Patient was admitted on 24 with acute cognitive/specch dysfunction Dx with a thrombetic CVA of the right Middle Cerebral artery   Prior diagnostic testing:: MRI  Previous treatments prior to current episode?: Surgery, Inpatient rehab     ASSESSMENT      Impression: Assessment: Patient demonstrates no significant deficits of ROM or strength which would require PT intervention at this time        Statement of Medical Necessity: Physical Therapy is both indicated and medically necessary as outlined in the POC to increase the likelihood of meeting the functionally related goals stated below.      Patient's Activity

## 2024-07-09 NOTE — PROGRESS NOTES
37395 Fluidotherapy     55868 Manual     27652 Therapeutic Ex     46915 Therapeutic Activity     [id number] ADL/COMP Tech Train     29178 Neuromuscular Re-Ed     87250 OrthoManagementTraining     69384 Paraffin     72813 Electrical Stim - Attended     38273 Iontophoresis     68375 Ultrasound      Other       45 1        Electronically signed by: Danielle Roger OTR/L [id number]

## 2024-07-11 ENCOUNTER — HOSPITAL ENCOUNTER (OUTPATIENT)
Dept: OCCUPATIONAL THERAPY | Age: 54
Setting detail: THERAPIES SERIES
Discharge: HOME | End: 2024-07-11
Payer: COMMERCIAL

## 2024-07-11 PROCEDURE — 97112 NEUROMUSCULAR REEDUCATION: CPT

## 2024-07-11 NOTE — PROGRESS NOTES
OT Treatment to include:      * Instruction/training on adapted ADL techniques and AE recommendations to increase functional independence    * Patient/Family education to increase follow through with safety techniques and functional independence  * Cognitive retraining/development of therapeutic activities to improve problem solving, judgement, memory, and attention for increased safety/participation in ADL/IADL tasks  * Visual-perceptual training to improve environmental scanning, visual attention/focus, and oculomotor skills for increased safety/independence with functional transfers/mobility and ADLs  * Therapeutic activities for increased independence with ADLs  * Neuro-muscular re-education     Patient Specific Goal: \"To get better.\"                             GOALS (Long term same as Short term):  1) Patient will demonstrate good understanding of home program (exercises/activities/AE/adaptive/compensatory techniques) with 75- 100% accuracy.   2) Patient will demonstrate decreased Left-sided inattention by independently negotiating obstacles in environment without cueing   3) Patient will Independently and spontaneously employ visual scanning strategies during ADL/IADLs such as feeding, locating items in cupboard, etc.  4) Patient will write checks and fill out forms with Supervision and no more than 2 mistakes  5) Patient will tolerate watching TV, reading, and completing paper work/computer work for at least 20 minutes with no more than 1 instance of a rest break  6) Patient will locate 7/7 items in magazine/grocery ad with no cueing and good carry over of adaptive strategies and visual scanning    TODAY'S TREATMENT     Pain Level: Pt reports no pain today    Subjective: \"I can't believe how hard this is,\" when copying pattern with pegs     Objective:    Updated POC to be completed by 10th visit.    INTERVENTION: COMPLETED: SPECIFICS/COMMENTS:   Visual scanning/Neuro R-Ed      X     X     X       X       X

## 2024-07-15 ENCOUNTER — HOSPITAL ENCOUNTER (OUTPATIENT)
Dept: OCCUPATIONAL THERAPY | Age: 54
Setting detail: THERAPIES SERIES
Discharge: HOME | End: 2024-07-15
Payer: COMMERCIAL

## 2024-07-15 ENCOUNTER — HOSPITAL ENCOUNTER (OUTPATIENT)
Dept: SPEECH THERAPY | Age: 54
Setting detail: THERAPIES SERIES
Discharge: HOME | End: 2024-07-15
Payer: COMMERCIAL

## 2024-07-15 PROCEDURE — 92523 SPEECH SOUND LANG COMPREHEN: CPT | Performed by: SPEECH-LANGUAGE PATHOLOGIST

## 2024-07-15 PROCEDURE — 97112 NEUROMUSCULAR REEDUCATION: CPT

## 2024-07-15 PROCEDURE — 97530 THERAPEUTIC ACTIVITIES: CPT

## 2024-07-15 NOTE — PROGRESS NOTES
[facility]  SPEECH/LANGUAGE PATHOLOGY  SPEECH/LANGUAGE/COGNITIVE EVALUATION   and PLAN OF CARE      PATIENT NAME:  Iain Husain  (male)     MRN:  [id number]    :  1970  (53 y.o.)  STATUS:  Outpatient clinic   TODAY'S DATE:  7/15/2024  REFERRING PROVIDER:    Paulette Howard MD  SPECIFIC PROVIDER ORDER: SLP eval and treat  Date of order:  2024  EVALUATING THERAPIST: JOE Smith    CERTIFICATION/RECERTIFICATION PERIOD: 7/15/2024 to 10/13/24  INSURANCE PROVIDER:  Payor: [employer] / Plan: [employer] [address] / Product Type: *No Product type* /    INSURANCE ID:  [id number] - (Commercial)   SECONDARY INSURANCE (if applicable):        CPT Codes  EVALUATION: 04000 Evaluation of Speech Sound Language Comprehension     60 Minutes     TREATMENT:  Requesting treatment authorization for  12 visits over 12 weeks focusing on the following CPT codes:      15404 Speech/Language Therapy     30 Minutes    REFERRING/TREATMENT DIAGNOSIS: Cerebral infarction due to unspecified occlusion or stenosis of right middle cerebral artery [I63.511]        SPEECH THERAPY  PLAN OF CARE   The speech therapy  POC is established based on physician order, speech pathology diagnosis and results of clinical assessment     SPEECH PATHOLOGY DIAGNOSIS:        Outpatient Speech Pathology intervention is recommended 1 time  per week for the above certification period.    Conditions Requiring Skilled Therapeutic Intervention for speech, language and/or cognition    Expressive Aphasia   Anomia    Specific Speech Therapy Interventions to Include:   Confrontational Naming task training   Therapeutic tasks for paraphasic errors  Expressive language training     Specific instructions for next treatment:     To initiate POC    SHORT/LONG TERM GOALS   Pt will improve word finding and verbal fluency with phrase/sentence level, wh-questions and open ended conversation through incorporation of

## 2024-07-15 NOTE — PROGRESS NOTES
Plans/Goals, Risks/Benefits discussed  [] Home exercise program  Method of Education: [x] Verbal  [x] Demo  [] Written  Comprehension of Education:  [x] Verbalizes understanding.  [x] Demonstrates understanding.  [] Needs Review.  [] Demonstrates/verbalizes understanding of HEP/Ed previously given.      Time In: 2pm            Time Out: 3pm           CODE  Minutes  Units   15201 Fluidotherapy     69057 Paraffin     82368 Ultrasound     19815 Electrical Stim - Attended     78651 Iontophoresis     57129 Therapeutic Ex     81951 Therapeutic Activity 30 2   15339 Neuromuscular Re-Ed 30 2   65180 Manual Therapy     63930 ADL/COMP Tech Train     09523 Orthotic Management/Training      Other                 Total  60 4       Plan: OT 1-2x / week for 18 visits.   Certification period From: 7/9/24  To: 10/9/24     [x]  Continues Plan of care with focus on AE/compensatory/adaptive strategies during ADL/IADLs, spatial awareness, spatial relations, visual scanning, and ADL/IADL participation for reduced need for assistance with daily activities : Treatment covered based on POC and graduated to patient's progress. Pt education continues at each visit to obtain maximum benefits from skilled OT intervention.  []  Alter Plan of care:   []  Discharge:        Jaleesa KWOK, [id number]

## 2024-07-17 ENCOUNTER — HOSPITAL ENCOUNTER (OUTPATIENT)
Dept: OCCUPATIONAL THERAPY | Age: 54
Setting detail: THERAPIES SERIES
Discharge: HOME | End: 2024-07-17
Payer: COMMERCIAL

## 2024-07-17 PROCEDURE — 97530 THERAPEUTIC ACTIVITIES: CPT

## 2024-07-17 PROCEDURE — 97112 NEUROMUSCULAR REEDUCATION: CPT

## 2024-07-17 NOTE — PROGRESS NOTES
OCCUPATIONAL THERAPY DAILY NOTE  Y [facility]  [facility]  [address] [address]  Dept: [phone]  Loc: [phone]   [facility] OT Fax: [phone]      Date:  2024     Initial Evaluation Date: 24                                  Evaluating Therapist: Danielle Roger OT     Patient Name:  Iain Husain                         :  1970     Restrictions/Precautions:  Aphasia, low fall risk  Diagnosis:  I63.511 (ICD-10-CM) - Cerebral infarction due to unspecified occlusion or stenosis of right middle cerebral artery                                                                Date of Surgery/Injury: 24     Insurance/Certification information:  [employer] [address]   25  visits combined PT/OT /chiro then auth  Then call [employer] [phone]  Plan of care signed (Y/N): N  Visit# / total visits:      Referring Practitioner:  Paulette Howard MD     Specific Practitioner Orders: OT Eval & Treat     Reason for Referral: Pt is a 53 y.o. male who on 24 experienced AMS and was found to have R MCA occlusion. Imaging showed a right posterior parietal occipital infarction. Pt underwent thrombectomy. Pt now presents to outpatient OT for eval & treat.     Assessment of current deficits   [] Functional mobility             [x] ADLs           [] Strength                  [x] Cognition   [] Functional transfers           [x] IADLs          [] Safety Awareness  [x] Endurance   [] Fine Motor Coordination    [] Balance      [x] Vision/perception    [] Sensation     [] Gross Motor Coordination [] ROM           [] Pain                        [] Edema          [] Scar Adhesion/Skin Integrity [] Motor Control [] Postural Alignment      OT PLAN OF CARE   OT POC based on physician orders, patient diagnosis and results of clinical assessment     Frequency/Duration: Frequency/Duration 1-2x / week for 18 visits.   Certification period From: 24  To: 10/9/24  Specific

## 2024-07-22 ENCOUNTER — HOSPITAL ENCOUNTER (OUTPATIENT)
Dept: SPEECH THERAPY | Age: 54
Setting detail: THERAPIES SERIES
Discharge: HOME | End: 2024-07-22
Payer: COMMERCIAL

## 2024-07-22 ENCOUNTER — HOSPITAL ENCOUNTER (OUTPATIENT)
Dept: OCCUPATIONAL THERAPY | Age: 54
Setting detail: THERAPIES SERIES
Discharge: HOME | End: 2024-07-22
Payer: COMMERCIAL

## 2024-07-22 PROCEDURE — 92507 TX SP LANG VOICE COMM INDIV: CPT | Performed by: SPEECH-LANGUAGE PATHOLOGIST

## 2024-07-22 PROCEDURE — 97112 NEUROMUSCULAR REEDUCATION: CPT | Performed by: OCCUPATIONAL THERAPIST

## 2024-07-22 PROCEDURE — 97530 THERAPEUTIC ACTIVITIES: CPT | Performed by: OCCUPATIONAL THERAPIST

## 2024-07-22 NOTE — PROGRESS NOTES
OCCUPATIONAL THERAPY DAILY NOTE  Y [facility]  [facility]  [address] [address]  Dept: [phone]  Loc: [phone]   [facility] OT Fax: [phone]      Date:  2024     Initial Evaluation Date: 24                                  Evaluating Therapist: Danielle Roger OT     Patient Name:  Iain Husain                         :  1970     Restrictions/Precautions:  Aphasia, low fall risk  Diagnosis:  I63.511 (ICD-10-CM) - Cerebral infarction due to unspecified occlusion or stenosis of right middle cerebral artery                                                                Date of Surgery/Injury: 24     Insurance/Certification information:  [employer] [address]   25  visits combined PT/OT /chiro then auth  Then call [employer] [phone]  Plan of care signed (Y/N): N  Visit# / total visits:      Referring Practitioner:  Paulette Howard MD     Specific Practitioner Orders: OT Eval & Treat     Reason for Referral: Pt is a 53 y.o. male who on 24 experienced AMS and was found to have R MCA occlusion. Imaging showed a right posterior parietal occipital infarction. Pt underwent thrombectomy. Pt now presents to outpatient OT for eval & treat.     Assessment of current deficits   [] Functional mobility             [x] ADLs           [] Strength                  [x] Cognition   [] Functional transfers           [x] IADLs          [] Safety Awareness  [x] Endurance   [] Fine Motor Coordination    [] Balance      [x] Vision/perception    [] Sensation     [] Gross Motor Coordination [] ROM           [] Pain                        [] Edema          [] Scar Adhesion/Skin Integrity [] Motor Control [] Postural Alignment      OT PLAN OF CARE   OT POC based on physician orders, patient diagnosis and results of clinical assessment     Frequency/Duration: Frequency/Duration 1-2x / week for 18 visits.   Certification period From: 24  To: 10/9/24  Specific

## 2024-07-23 NOTE — PROGRESS NOTES
SPEECH LANGUAGE PATHOLOGY  DAILY PROGRESS NOTE        PATIENT NAME:  Iain Husain      :  1970          TODAY'S DATE:  2024      POC:    Pt will improve word finding and verbal fluency with phrase/sentence level, wh-questions and open ended conversation through incorporation of preparatory and circumlocution strategies 75% accuracy.    2.   Pt will improve expressive language skills to improve accuracy of completion of automatic speech tasks, object/picture naming, phrase completion, and phrasal expression of basic wants and needs with 75% accuracy.      Subjective:    Patient was seen this date for 30 minute speech/language therapy. The pt was engaged and cooperative during all activities.         Objective:    The clinician first initiated an alternating word sequence task to target rapid production of alternating words. The pt demonstrated independence in reading the majority of words provided- initial productions of words contained phonemic and semantic paraphasias; however, the pt demonstrated good self correction by producing the word multiple times.     The clinician then initiated a naming activity of everyday items. The pt named items with 50% accuracy on first attempt but demonstrated good self-correction without clinician prompting. Errors included phonemic paraphasias (\"push\" for fish, \"clotch\" for clock, \"knife\" for kite) as well as semantic paraphasias (\"sun\" for rainbow, \"cat\" for dog, \"toy girl\" for baby doll).     For the final activity, the pt was asked to name pictured verbs which he completed with 80% accuracy on first attempt. Several instances of perseveration noted across trials.     Assessment:    Pt continues to make progress toward POC.       Plan:    Continue to implement current intervention plan.                                                   Ellen Abdi MS, CCC-SLP      CPT code(s) 88002  speech/language tx  Total minutes :  30 minutes

## 2024-07-25 ENCOUNTER — HOSPITAL ENCOUNTER (OUTPATIENT)
Dept: OCCUPATIONAL THERAPY | Age: 54
Setting detail: THERAPIES SERIES
Discharge: HOME | End: 2024-07-25
Payer: COMMERCIAL

## 2024-07-25 ENCOUNTER — HOSPITAL ENCOUNTER (OUTPATIENT)
Dept: OCCUPATIONAL THERAPY | Age: 54
Setting detail: THERAPIES SERIES
End: 2024-07-25
Payer: COMMERCIAL

## 2024-07-25 NOTE — PROGRESS NOTES
Occupational Therapy      Phone: [phone] Fax: [phone]     Occupational Therapy   Cancellation/No-show Note     Patient Name:  Iain Husain  : 1970  Date:  2024  MRN: [id number]    For today's appointment patient:   []  Cancelled   []  Rescheduled appointment   [x]  No-show     Reason given by patient:   []  Patient ill   []  Conflicting appointment   []  No transportation   []  Conflict with work   []  No reason given   [x]  Other:    Comments: Pt may have been confused with second appt this week    Electronically signed by:Jaleesa KWOK, [id number]

## 2024-07-29 ENCOUNTER — HOSPITAL ENCOUNTER (OUTPATIENT)
Dept: SPEECH THERAPY | Age: 54
Setting detail: THERAPIES SERIES
Discharge: HOME | End: 2024-07-29
Payer: COMMERCIAL

## 2024-07-29 ENCOUNTER — HOSPITAL ENCOUNTER (OUTPATIENT)
Dept: OCCUPATIONAL THERAPY | Age: 54
Setting detail: THERAPIES SERIES
Discharge: HOME | End: 2024-07-29
Payer: COMMERCIAL

## 2024-07-29 PROCEDURE — 92507 TX SP LANG VOICE COMM INDIV: CPT | Performed by: SPEECH-LANGUAGE PATHOLOGIST

## 2024-07-29 NOTE — PROGRESS NOTES
Occupational Therapy      Phone: [phone] Fax: [phone]     Occupational Therapy   Cancellation Note     Patient Name:  Iain Husain  : 1970  Date:  2024  MRN: [id number]    For today's appointment patient:   [x]  Cancelled   []  Rescheduled appointment   []  No-show     Reason given by patient:   []  Patient ill   []  Conflicting appointment   []  No transportation   []  Conflict with work   []  No reason given   [x]  Other:    Comments: Pt was confused with OT appt times and missed then cx'd. Therapist spoke with patient and wrote appt times down for him.    Electronically signed by: Jaleesa KWOK, [id number]

## 2024-07-29 NOTE — PROGRESS NOTES
SPEECH LANGUAGE PATHOLOGY  DAILY PROGRESS NOTE        PATIENT NAME:  Iain Husain      :  1970          TODAY'S DATE:  2024      POC:    Pt will improve word finding and verbal fluency with phrase/sentence level, wh-questions and open ended conversation through incorporation of preparatory and circumlocution strategies 75% accuracy.    2.   Pt will improve expressive language skills to improve accuracy of completion of automatic speech tasks, object/picture naming, phrase completion, and phrasal expression of basic wants and needs with 75% accuracy.      Subjective:    Patient was seen this date for 30 minute speech/language therapy. The pt was engaged and cooperative during all activities.         Objective:    The clinician first initiated an alternating word sequence task to target production of multi-syllabic alternating words. The pt demonstrated independence in reading the majority of words provided- initial productions of words contained phonemic and semantic paraphasias; however, the pt demonstrated good self correction by producing the word multiple times. The clinician encouraged the pt to slow productions and slow attempts as he consistently attempted words in rapid succession which increased errors.     The clinician then initiated a naming activity of everyday items given the written word as the pt verbalized difficulty with reading. The pt read the given words with 80% accuracy- errors characterized by phonemic paraphasias which were self-corrected during the majority of productions. The clinician then targeted written multi-syllabic words- pt produced with 70% accuracy and consistent phonemic paraphasias.     For the final activity, the pt was asked to produce sentences describing pictured scenes. In connected speech, the pt demonstrated the most paraphasias- pt also deleted articles and connecting words occasionally. The clinician re-framed sentences to provide models for complete

## 2024-08-01 ENCOUNTER — HOSPITAL ENCOUNTER (OUTPATIENT)
Dept: OCCUPATIONAL THERAPY | Age: 54
Setting detail: THERAPIES SERIES
Discharge: HOME | End: 2024-08-01
Payer: COMMERCIAL

## 2024-08-01 PROCEDURE — 97530 THERAPEUTIC ACTIVITIES: CPT

## 2024-08-01 PROCEDURE — 97112 NEUROMUSCULAR REEDUCATION: CPT

## 2024-08-01 NOTE — PROGRESS NOTES
OCCUPATIONAL THERAPY DAILY NOTE  Y [facility]  [facility]  [address] [address]  Dept: [phone]  Loc: [phone]   [facility] OT Fax: [phone]      Date:  2024     Initial Evaluation Date: 24                                  Evaluating Therapist: Danielle Roger OT     Patient Name:  Iain Husain                         :  1970     Restrictions/Precautions:  Aphasia, low fall risk  Diagnosis:  I63.511 (ICD-10-CM) - Cerebral infarction due to unspecified occlusion or stenosis of right middle cerebral artery                                                                Date of Surgery/Injury: 24     Insurance/Certification information:  [employer] [address]   25  visits combined PT/OT /chiro then auth  Then call [employer] [phone]  Plan of care signed (Y/N): N  Visit# / total visits:      Referring Practitioner:  Paulette Howard MD     Specific Practitioner Orders: OT Eval & Treat     Reason for Referral: Pt is a 53 y.o. male who on 24 experienced AMS and was found to have R MCA occlusion. Imaging showed a right posterior parietal occipital infarction. Pt underwent thrombectomy. Pt now presents to outpatient OT for eval & treat.     Assessment of current deficits   [] Functional mobility             [x] ADLs           [] Strength                  [x] Cognition   [] Functional transfers           [x] IADLs          [] Safety Awareness  [x] Endurance   [] Fine Motor Coordination    [] Balance      [x] Vision/perception    [] Sensation     [] Gross Motor Coordination [] ROM           [] Pain                        [] Edema          [] Scar Adhesion/Skin Integrity [] Motor Control [] Postural Alignment      OT PLAN OF CARE   OT POC based on physician orders, patient diagnosis and results of clinical assessment     Frequency/Duration: Frequency/Duration 1-2x / week for 18 visits.   Certification period From: 24  To: 10/9/24  Specific OT

## 2024-08-05 ENCOUNTER — APPOINTMENT (OUTPATIENT)
Dept: SPEECH THERAPY | Age: 54
End: 2024-08-05
Payer: COMMERCIAL

## 2024-08-05 ENCOUNTER — HOSPITAL ENCOUNTER (OUTPATIENT)
Dept: OCCUPATIONAL THERAPY | Age: 54
Setting detail: THERAPIES SERIES
Discharge: HOME | End: 2024-08-05
Payer: COMMERCIAL

## 2024-08-05 PROCEDURE — 97530 THERAPEUTIC ACTIVITIES: CPT

## 2024-08-05 PROCEDURE — 97112 NEUROMUSCULAR REEDUCATION: CPT

## 2024-08-05 NOTE — PROGRESS NOTES
Treatment to include:      * Instruction/training on adapted ADL techniques and AE recommendations to increase functional independence    * Patient/Family education to increase follow through with safety techniques and functional independence  * Cognitive retraining/development of therapeutic activities to improve problem solving, judgement, memory, and attention for increased safety/participation in ADL/IADL tasks  * Visual-perceptual training to improve environmental scanning, visual attention/focus, and oculomotor skills for increased safety/independence with functional transfers/mobility and ADLs  * Therapeutic activities for increased independence with ADLs  * Neuro-muscular re-education     Patient Specific Goal: \"To get better.\"                             GOALS (Long term same as Short term):  1) Patient will demonstrate good understanding of home program (exercises/activities/AE/adaptive/compensatory techniques) with 75- 100% accuracy.   2) Patient will demonstrate decreased Left-sided inattention by independently negotiating obstacles in environment without cueing   3) Patient will Independently and spontaneously employ visual scanning strategies during ADL/IADLs such as feeding, locating items in cupboard, etc.  4) Patient will write checks and fill out forms with Supervision and no more than 2 mistakes  5) Patient will tolerate watching TV, reading, and completing paper work/computer work for at least 20 minutes with no more than 1 instance of a rest break  6) Patient will locate 7/7 items in magazine/grocery ad with no cueing and good carry over of adaptive strategies and visual scanning    TODAY'S TREATMENT     Pain Level: Pt reports no pain today    Subjective:  Pt states, \" Reading is really hard! My eyes just get everything jumbled up.\"     Objective:    Updated POC to be completed by 10th visit.    INTERVENTION: COMPLETED: SPECIFICS/COMMENTS:   Visual scanning/Neuro R-Ed

## 2024-08-08 ENCOUNTER — HOSPITAL ENCOUNTER (OUTPATIENT)
Dept: OCCUPATIONAL THERAPY | Age: 54
Setting detail: THERAPIES SERIES
Discharge: HOME | End: 2024-08-08
Payer: COMMERCIAL

## 2024-08-08 PROCEDURE — 97112 NEUROMUSCULAR REEDUCATION: CPT

## 2024-08-08 PROCEDURE — 97530 THERAPEUTIC ACTIVITIES: CPT

## 2024-08-08 NOTE — PROGRESS NOTES
OCCUPATIONAL THERAPY DAILY NOTE  Y [facility]  [facility]  [address] [facility] [address] [address]  Dept: [phone]  Loc: [phone]   [facility] OT Fax: [phone]      Date:  2024     Initial Evaluation Date: 24                                  Evaluating Therapist: Danielle Roger OT     Patient Name:  Iain Husain                         :  1970     Restrictions/Precautions:  Aphasia, low fall risk  Diagnosis:  I63.511 (ICD-10-CM) - Cerebral infarction due to unspecified occlusion or stenosis of right middle cerebral artery                                                                Date of Surgery/Injury: 24     Insurance/Certification information:  [employer] [address]   25  visits combined PT/OT /chiro then auth  Then call [employer] [phone]  Plan of care signed (Y/N): N  Visit# / total visits:      Referring Practitioner:  Paulette Howard MD     Specific Practitioner Orders: OT Eval & Treat     Reason for Referral: Pt is a 53 y.o. male who on 24 experienced AMS and was found to have R MCA occlusion. Imaging showed a right posterior parietal occipital infarction. Pt underwent thrombectomy. Pt now presents to outpatient OT for eval & treat.     Assessment of current deficits   [] Functional mobility             [x] ADLs           [] Strength                  [x] Cognition   [] Functional transfers           [x] IADLs          [] Safety Awareness  [x] Endurance   [] Fine Motor Coordination    [] Balance      [x] Vision/perception    [] Sensation     [] Gross Motor Coordination [] ROM           [] Pain                        [] Edema          [] Scar Adhesion/Skin Integrity [] Motor Control [] Postural Alignment      OT PLAN OF CARE   OT POC based on physician orders, patient diagnosis and results of clinical assessment     Frequency/Duration: Frequency/Duration 1-2x / week for 18 visits.   Certification period From: 24  To: 10/9/24  Specific OT

## 2024-08-12 ENCOUNTER — HOSPITAL ENCOUNTER (OUTPATIENT)
Dept: OCCUPATIONAL THERAPY | Age: 54
Setting detail: THERAPIES SERIES
Discharge: HOME | End: 2024-08-12
Payer: COMMERCIAL

## 2024-08-12 ENCOUNTER — HOSPITAL ENCOUNTER (OUTPATIENT)
Dept: SPEECH THERAPY | Age: 54
Setting detail: THERAPIES SERIES
Discharge: HOME | End: 2024-08-12
Payer: COMMERCIAL

## 2024-08-12 PROCEDURE — 97112 NEUROMUSCULAR REEDUCATION: CPT | Performed by: OCCUPATIONAL THERAPIST

## 2024-08-12 PROCEDURE — 92507 TX SP LANG VOICE COMM INDIV: CPT | Performed by: SPEECH-LANGUAGE PATHOLOGIST

## 2024-08-12 PROCEDURE — 97530 THERAPEUTIC ACTIVITIES: CPT | Performed by: OCCUPATIONAL THERAPIST

## 2024-08-12 NOTE — PROGRESS NOTES
SPEECH LANGUAGE PATHOLOGY  DAILY PROGRESS NOTE        PATIENT NAME:  Iain Husain      :  1970          TODAY'S DATE:  2024      POC:    Pt will improve word finding and verbal fluency with phrase/sentence level, wh-questions and open ended conversation through incorporation of preparatory and circumlocution strategies 75% accuracy.    2.   Pt will improve expressive language skills to improve accuracy of completion of automatic speech tasks, object/picture naming, phrase completion, and phrasal expression of basic wants and needs with 75% accuracy.      Subjective:    Patient was seen this date for 30 minute speech/language therapy. The pt was engaged and cooperative during all activities.         Objective:    The clinician first initiated an alternating word sequence task to target production of multi-syllabic alternating words. The pt demonstrated independence in reading the majority of words provided- far less paraphasias noted during this activity ( 3 paraphasias noted across 20 trials).     The clinician then initiated an activity during which a sight word was provided and the pt was asked to construct a sentence given the sight word. The pt demonstrated occasional paraphasias during sentence production and in 2 instances benefited from clinician prompts to start the sentence with the sight word given.     For the final activity, the pt was asked to produce sentences describing pictured scenes. In connected speech, the pt demonstrated the most paraphasias- pt also deleted articles and connecting words occasionally. The clinician re-framed sentences to provide models for complete sentences. For paraphasias, the pt demonstrated good self-correction. Paraphasias noted included \"pancakes\" for \"picnics\", \"moon/sun\" for \"rain\".     Assessment:    Pt continues to make progress toward POC.       Plan:    Continue to implement current intervention plan.

## 2024-08-12 NOTE — PROGRESS NOTES
60 4       Plan: OT 1-2x / week for 18 visits.   Certification period From: 7/9/24  To: 10/9/24     [x]  Continues Plan of care with focus on AE/compensatory/adaptive strategies during ADL/IADLs, spatial awareness, spatial relations, visual scanning, and ADL/IADL participation for reduced need for assistance with daily activities : Treatment covered based on POC and graduated to patient's progress. Pt education continues at each visit to obtain maximum benefits from skilled OT intervention.  []  Alter Plan of care:   []  Discharge:        Gertrude Loomis MS, OTR/L #[id number]'

## 2024-08-13 NOTE — H&P (VIEW-ONLY)
This is a post hospital discharge follow-up for stroke.    Brief HPI & Interval History:     Briefly, Mr. Husain underwent right MCA thrombectomy for subocclusive thrombus back on 6/26/2024 when he had presented with aphasia, left-sided weakness and left-sided visual field deficits.  I labeled the etiology of his stroke to be secondary to intracranial atherosclerotic disease.  He was not on any medications prior to his stroke.  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  Hemoglobin A1c was 4.8.  .  Echocardiogram showed EF of 55 to 60%.    He is here in the office today with his wife.  He is currently on aspirin 325 mg and Plavix 75 mg daily.  He is also on Lipitor 40 mg.  He is still undergoing outpatient speech therapy and is overall doing very well.  He does not smoke.  He works as a  and states that he is able to concentrate and focus on his work.    ROS:     Endorses occasional dizziness with some rapid movements of the neck from side-to-side.  All other systems were reviewed and are negative for any other complaints at this time.    Objective:     BP (!) 136/90 (Site: Left Upper Arm, Position: Sitting, Cuff Size: Large Adult)   Pulse 62   Ht 1.829 m (6')   Wt 117 kg (258 lb)   SpO2 97%   BMI 34.99 kg/m²     Constitutional: Well developed, well nourished and in no acute distress.   Respiratory: Clear to auscultation bilaterally with no use of accessory muscles during respiration.   Cardiovascular:  No murmurs auscultated. Carotid arteries without bruits. Pedal pulses and radial pulses 2+ bilaterally. No edema in all four extremities.     Neurological:    No acute distress.  Good fund of knowledge.  Full range of extraocular movements.  There is still residual left inferior quadrant visual field deficit.  Face symmetric.  No aphasia.  No dysarthria.  Left upper

## 2024-08-15 ENCOUNTER — HOSPITAL ENCOUNTER (OUTPATIENT)
Dept: OCCUPATIONAL THERAPY | Age: 54
Setting detail: THERAPIES SERIES
Discharge: HOME | End: 2024-08-15
Payer: COMMERCIAL

## 2024-08-15 PROCEDURE — 97530 THERAPEUTIC ACTIVITIES: CPT

## 2024-08-15 PROCEDURE — 97112 NEUROMUSCULAR REEDUCATION: CPT

## 2024-08-15 NOTE — PROGRESS NOTES
reduced need for assistance with daily activities : Treatment covered based on POC and graduated to patient's progress. Pt education continues at each visit to obtain maximum benefits from skilled OT intervention.  []  Alter Plan of care:   []  Discharge:        Jaleesa KWOK, [id number]

## 2024-08-19 ENCOUNTER — TELEPHONE (OUTPATIENT)
Dept: INTERVENTIONAL RADIOLOGY/VASCULAR | Age: 54
End: 2024-08-19

## 2024-08-19 ENCOUNTER — HOSPITAL ENCOUNTER (OUTPATIENT)
Dept: SPEECH THERAPY | Age: 54
Setting detail: THERAPIES SERIES
Discharge: HOME | End: 2024-08-19
Payer: COMMERCIAL

## 2024-08-19 ENCOUNTER — HOSPITAL ENCOUNTER (OUTPATIENT)
Dept: OCCUPATIONAL THERAPY | Age: 54
Setting detail: THERAPIES SERIES
Discharge: HOME | End: 2024-08-19
Payer: COMMERCIAL

## 2024-08-19 PROCEDURE — 97112 NEUROMUSCULAR REEDUCATION: CPT

## 2024-08-19 PROCEDURE — 97530 THERAPEUTIC ACTIVITIES: CPT

## 2024-08-19 PROCEDURE — 92507 TX SP LANG VOICE COMM INDIV: CPT | Performed by: SPEECH-LANGUAGE PATHOLOGIST

## 2024-08-19 NOTE — TELEPHONE ENCOUNTER
Multiple calls with messages left last week to patient with no return call to schedule IR diagnostic cerebral ordered by Dr Chowdhury. Called spouse today and left message to return call to schedule procedure.

## 2024-08-19 NOTE — PROGRESS NOTES
OCCUPATIONAL THERAPY DAILY NOTE  Y [facility]  [facility]  [address] [facility] [address] [facility] [address]  Dept: [phone]  Loc: [phone]   [facility] OT Fax: [phone]      Date:  2024     Initial Evaluation Date: 24                                  Evaluating Therapist: Danielle Roger OT     Patient Name:  Iain Husain                         :  1970     Restrictions/Precautions:  Aphasia, low fall risk  Diagnosis:  I63.511 (ICD-10-CM) - Cerebral infarction due to unspecified occlusion or stenosis of right middle cerebral artery                                                                Date of Surgery/Injury: 24     Insurance/Certification information:  [employer] [address]   25  visits combined PT/OT /chiro then auth  Then call [employer] [phone]  Plan of care signed (Y/N): N  Visit# / total visits:      Referring Practitioner:  Paulette Howard MD     Specific Practitioner Orders: OT Eval & Treat     Reason for Referral: Pt is a 53 y.o. male who on 24 experienced AMS and was found to have R MCA occlusion. Imaging showed a right posterior parietal occipital infarction. Pt underwent thrombectomy. Pt now presents to outpatient OT for eval & treat.     Assessment of current deficits   [] Functional mobility             [x] ADLs           [] Strength                  [x] Cognition   [] Functional transfers           [x] IADLs          [] Safety Awareness  [x] Endurance   [] Fine Motor Coordination    [] Balance      [x] Vision/perception    [] Sensation     [] Gross Motor Coordination [] ROM           [] Pain                        [] Edema          [] Scar Adhesion/Skin Integrity [] Motor Control [] Postural Alignment      OT PLAN OF CARE   OT POC based on physician orders, patient diagnosis and results of clinical assessment     Frequency/Duration: Frequency/Duration 1-2x / week for 18 visits.   Certification period From: 24  To: 10/9/24  Specific

## 2024-08-20 NOTE — PROGRESS NOTES
SPEECH LANGUAGE PATHOLOGY  DAILY PROGRESS NOTE        PATIENT NAME:  Iain Husain      :  1970          TODAY'S DATE:  2024      POC:    Pt will improve word finding and verbal fluency with phrase/sentence level, wh-questions and open ended conversation through incorporation of preparatory and circumlocution strategies 75% accuracy.    2.   Pt will improve expressive language skills to improve accuracy of completion of automatic speech tasks, object/picture naming, phrase completion, and phrasal expression of basic wants and needs with 75% accuracy.      Subjective:    Patient was seen this date for 30 minute speech/language therapy. The pt was engaged and cooperative during all activities.         Objective:    The clinician first initiated an alternating word sequence task to target production of multi-syllabic alternating words. The pt demonstrated independence in reading the majority of words provided- far less paraphasias noted during this activity.    The clinician then initiated a sentence completion activity- the pt was provided the beginning of a sentence (\"I really wish I could\") and then asked to complete the sentence providing detail. The pt demonstrated difficulty imitating clinician production of the initial portion of the sentence producing with paraphasias/ inverting words. The pt completed the sentences appropriately without paraphasias in 75% of opportunities. Worksheet provided for at home practice.     Assessment:    Pt continues to make progress toward POC.       Plan:    Continue to implement current intervention plan.                                                   Ellen Abdi MS, CCC-SLP      CPT code(s) 49820  speech/language tx  Total minutes :  30 minutes

## 2024-08-21 ENCOUNTER — CLINICAL DOCUMENTATION (OUTPATIENT)
Dept: INTERVENTIONAL RADIOLOGY/VASCULAR | Age: 54
End: 2024-08-21

## 2024-08-21 NOTE — PROGRESS NOTES
I called patients [facility] and spoke with Authorization Rep Tita.   No Prior auth is required  REF: [id number]

## 2024-08-22 ENCOUNTER — HOSPITAL ENCOUNTER (OUTPATIENT)
Dept: OCCUPATIONAL THERAPY | Age: 54
Setting detail: THERAPIES SERIES
Discharge: HOME | End: 2024-08-22
Payer: COMMERCIAL

## 2024-08-22 PROCEDURE — 97530 THERAPEUTIC ACTIVITIES: CPT

## 2024-08-22 PROCEDURE — 97110 THERAPEUTIC EXERCISES: CPT

## 2024-08-22 NOTE — PROGRESS NOTES
training in use of finger and/or ruler to keep place on page--just right challenge    -Finding targets in environment with cueing for L sided attention--difficulty locating items on L side but no instance of bumping into items    -Visual scanning tasks with computer tasks for identifying/locating letters/numbers scattered with cues for attention to L side (mod diff)     - Education provided on different techniques to use when utilizing technology and the computer to assist with visuals figure ground and blurring while working on the computer.     -Organizing [employer] pieces to promote visual scanning & cognitive processing        Cognitive:     Combination of Cognition and Visual Neuro re-edu                        HEP      X  Following written directions with hand outs for multistep directions.. Pt makes mistakes EX: Written direction was to [place] a room- pt was to [place] the word kitchen but instead he physically got up and circled the room, Therapist explains directions again and pt made no further mistakes    SNAP CIRCUIT x ^d 3 projects with increasing difficulty. > minimal difficulty- categorizing, following directions & problem solving skills challenged    Flash cards for colors, letters, numbers- for quick thinking & correct recognition. > minimal difficulty     Synchronized 1# ball throwing & catching tasks w/ therapist- juggling, switching of directions, etc... pt is min to mod challenged- when direction switches to left side pts difficulty increases    X         Completed visual scanning with cognitive steps to find certain words and follow sequencing provided by therapist with mod difficulty     - Playing cards spread throughout table with more on L side to find verbally instructed numbers with suits, etc...   AAROM:               PROM/Stretching:               Scar Mass/Edema Control:               Strengthening:               Other:     HEP X Mixed puzzle books, deck of card activities, computer

## 2024-08-26 ENCOUNTER — HOSPITAL ENCOUNTER (OUTPATIENT)
Dept: OCCUPATIONAL THERAPY | Age: 54
Setting detail: THERAPIES SERIES
Discharge: HOME | End: 2024-08-26
Payer: COMMERCIAL

## 2024-08-26 ENCOUNTER — HOSPITAL ENCOUNTER (OUTPATIENT)
Dept: SPEECH THERAPY | Age: 54
Setting detail: THERAPIES SERIES
End: 2024-08-26
Payer: COMMERCIAL

## 2024-08-26 PROCEDURE — 97112 NEUROMUSCULAR REEDUCATION: CPT

## 2024-08-26 PROCEDURE — 97530 THERAPEUTIC ACTIVITIES: CPT

## 2024-08-26 NOTE — PROGRESS NOTES
OCCUPATIONAL THERAPY DAILY NOTE  Y [facility]  [facility]  [address] [facility] [address]  Dept: [phone]  Loc: [phone]   [facility] OT Fax: [phone]      Date:  2024     Initial Evaluation Date: 24                                  Evaluating Therapist: Danielle Roger OT     Patient Name:  Iain Husain                         :  1970     Restrictions/Precautions:  Aphasia, low fall risk  Diagnosis:  I63.511 (ICD-10-CM) - Cerebral infarction due to unspecified occlusion or stenosis of right middle cerebral artery                                                                Date of Surgery/Injury: 24     Insurance/Certification information:  [employer] [address]   25  visits combined PT/OT /chiro then auth  Then call [employer] [phone]  Plan of care signed (Y/N): N  Visit# / total visits:      Referring Practitioner:  Paulette Howard MD     Specific Practitioner Orders: OT Eval & Treat     Reason for Referral: Pt is a 53 y.o. male who on 24 experienced AMS and was found to have R MCA occlusion. Imaging showed a right posterior parietal occipital infarction. Pt underwent thrombectomy. Pt now presents to outpatient OT for eval & treat.     Assessment of current deficits   [] Functional mobility             [x] ADLs           [] Strength                  [x] Cognition   [] Functional transfers           [x] IADLs          [] Safety Awareness  [x] Endurance   [] Fine Motor Coordination    [] Balance      [x] Vision/perception    [] Sensation     [] Gross Motor Coordination [] ROM           [] Pain                        [] Edema          [] Scar Adhesion/Skin Integrity [] Motor Control [] Postural Alignment      OT PLAN OF CARE   OT POC based on physician orders, patient diagnosis and results of clinical assessment     Frequency/Duration: Frequency/Duration 1-2x / week for 18 visits.   Certification period From: 24  To: 10/9/24  Specific  more than 2 mistakes Goal Met. Pt filled out 2 checks- a bill pay task in clinic without mistakes    5) Patient will tolerate watching TV, reading, and completing paper work/computer work for at least 20 minutes with no more than 1 instance of a rest break Goal Met: Pt reports being able to attend to these tasks for at least 20 minutes before taking a rest break.  5) Updated Goal: Pt will tolerate above tasks for up to an hour with no more than 1 instance of rest break    6) Patient will locate 7/7 items in magazine/grocery ad with no cueing and good carry over of adaptive strategies and visual scanning Goal Progressing: Pt requires ^d time for task. Pt can find 5/7 verbally instructed items    7) Added Goal 8/15/24: Pt will tolerate multitasking activities with improving ease for improving his work relates abilities such as talking on phone with a client while simultaneously using his computer    TODAY'S TREATMENT     Pain Level: Pt reports no pain today    Subjective:   Pt reports, \"This was good. This is hard.\" when he is completing a visual room scanning and then visual scanning with added memory task    Objective:    Updated POC completed 8/15/24    INTERVENTION: COMPLETED: SPECIFICS/COMMENTS:   Visual scanning/Neuro R-Ed                                        X                           Visual scanning - Keyboard Jump & Ninja application used today.  Pt recognizes most words and/or lettering     Visual scanning/eye hand coordination tasks- Keyboard Jump, Keyboard Ninja and Type a Balloon apps used today    Completing bill pay simulation task- a worksheet followed by writing a check. Pt completes at slow pace 100% correctly with no assist    -Locate varied verbally instructed items in a catalog with training in scanning/searching pattern and use of an anchor      -Finding targets in environment with cueing for L sided attention. Cones placed around large clinic room for pt to find at floor, mid level and high

## 2024-08-28 ENCOUNTER — HOSPITAL ENCOUNTER (OUTPATIENT)
Dept: SPEECH THERAPY | Age: 54
Setting detail: THERAPIES SERIES
Discharge: HOME | End: 2024-08-28
Payer: COMMERCIAL

## 2024-08-28 ENCOUNTER — HOSPITAL ENCOUNTER (OUTPATIENT)
Dept: OCCUPATIONAL THERAPY | Age: 54
Setting detail: THERAPIES SERIES
Discharge: HOME | End: 2024-08-28
Payer: COMMERCIAL

## 2024-08-28 PROCEDURE — 92507 TX SP LANG VOICE COMM INDIV: CPT | Performed by: SPEECH-LANGUAGE PATHOLOGIST

## 2024-08-28 PROCEDURE — 97530 THERAPEUTIC ACTIVITIES: CPT

## 2024-08-28 NOTE — PROGRESS NOTES
OCCUPATIONAL THERAPY DAILY NOTE  Y [facility]  [facility]  [address] [facility] [address]  Dept: [phone]  Loc: [phone]   [facility] OT Fax: [phone]      Date:  2024     Initial Evaluation Date: 24                                  Evaluating Therapist: Danielle Roger OT     Patient Name:  Iain Husain                         :  1970     Restrictions/Precautions:  Aphasia, low fall risk  Diagnosis:  I63.511 (ICD-10-CM) - Cerebral infarction due to unspecified occlusion or stenosis of right middle cerebral artery                                                                Date of Surgery/Injury: 24     Insurance/Certification information:  [employer] [address]   25  visits combined PT/OT /chiro then auth  Then call [employer] [phone]  Plan of care signed (Y/N): N  Visit# / total visits:      Referring Practitioner:  Paulette Howard MD     Specific Practitioner Orders: OT Eval & Treat     Reason for Referral: Pt is a 53 y.o. male who on 24 experienced AMS and was found to have R MCA occlusion. Imaging showed a right posterior parietal occipital infarction. Pt underwent thrombectomy. Pt now presents to outpatient OT for eval & treat.     Assessment of current deficits   [] Functional mobility             [x] ADLs           [] Strength                  [x] Cognition   [] Functional transfers           [x] IADLs          [] Safety Awareness  [x] Endurance   [] Fine Motor Coordination    [] Balance      [x] Vision/perception    [] Sensation     [] Gross Motor Coordination [] ROM           [] Pain                        [] Edema          [] Scar Adhesion/Skin Integrity [] Motor Control [] Postural Alignment      OT PLAN OF CARE   OT POC based on physician orders, patient diagnosis and results of clinical assessment     Frequency/Duration: Frequency/Duration 1-2x / week for 18 visits.   Certification period From: 24  To: 10/9/24  Specific  of care with focus on AE/compensatory/adaptive strategies during ADL/IADLs, spatial awareness, spatial relations, visual scanning, and ADL/IADL participation for reduced need for assistance with daily activities : Treatment covered based on POC and graduated to patient's progress. Pt education continues at each visit to obtain maximum benefits from skilled OT intervention.  []  Alter Plan of care:   []  Discharge:        Jaleesa KWOK, [id number]

## 2024-08-29 NOTE — PROGRESS NOTES
SPEECH LANGUAGE PATHOLOGY  DAILY PROGRESS NOTE        PATIENT NAME:  Iain Husain      :  1970          TODAY'S DATE:  2024      POC:    Pt will improve word finding and verbal fluency with phrase/sentence level, wh-questions and open ended conversation through incorporation of preparatory and circumlocution strategies 75% accuracy.    2.   Pt will improve expressive language skills to improve accuracy of completion of automatic speech tasks, object/picture naming, phrase completion, and phrasal expression of basic wants and needs with 75% accuracy.      Subjective:    Patient was seen this date for 30 minute speech/language therapy. The pt was engaged and cooperative during all activities.         Objective:    The clinician first initiated an alternating word sequence task to target production of multi-syllabic alternating words. The pt demonstrated independence in reading the majority of words provided- far less paraphasias noted during this activity.    The clinician then initiated a sentence completion activity- the pt was provided the beginning of a sentence (\"I really wish I could\") and then asked to complete the sentence providing detail. The pt demonstrated reduced paraphasias and word finding difficulties this date with errors quickly and independently fixed. The pt also demonstrated reduced paraphasias during conversational speech.     Assessment:    Pt continues to make progress toward POC.       Plan:    Continue to implement current intervention plan.                                                   Ellen Abdi MS, CCC-SLP      CPT code(s) 32620  speech/language tx  Total minutes :  30 minutes

## 2024-09-03 ENCOUNTER — HOSPITAL ENCOUNTER (OUTPATIENT)
Dept: INTERVENTIONAL RADIOLOGY/VASCULAR | Age: 54
Discharge: HOME | End: 2024-09-05
Payer: COMMERCIAL

## 2024-09-03 VITALS
DIASTOLIC BLOOD PRESSURE: 119 MMHG | HEIGHT: 71 IN | SYSTOLIC BLOOD PRESSURE: 164 MMHG | OXYGEN SATURATION: 98 % | WEIGHT: 258 LBS | HEART RATE: 88 BPM | BODY MASS INDEX: 36.12 KG/M2 | RESPIRATION RATE: 17 BRPM | TEMPERATURE: 97.6 F

## 2024-09-03 DIAGNOSIS — I67.1 INTRACRANIAL ANEURYSM: ICD-10-CM

## 2024-09-03 DIAGNOSIS — I63.511 CEREBROVASCULAR ACCIDENT (CVA) DUE TO OCCLUSION OF RIGHT MIDDLE CEREBRAL ARTERY (HCC): ICD-10-CM

## 2024-09-03 LAB
ANION GAP SERPL CALCULATED.3IONS-SCNC: 10 MMOL/L (ref 7–16)
BASOPHILS # BLD: 0.04 K/UL (ref 0–0.2)
BASOPHILS NFR BLD: 1 % (ref 0–2)
BUN SERPL-MCNC: 13 MG/DL (ref 6–20)
CALCIUM SERPL-MCNC: 9.1 MG/DL (ref 8.6–10.2)
CHLORIDE SERPL-SCNC: 93 MMOL/L (ref 98–107)
CO2 SERPL-SCNC: 26 MMOL/L (ref 22–29)
CREAT SERPL-MCNC: 1.1 MG/DL (ref 0.7–1.2)
EOSINOPHIL # BLD: 0.2 K/UL (ref 0.05–0.5)
EOSINOPHILS RELATIVE PERCENT: 4 % (ref 0–6)
ERYTHROCYTE [DISTWIDTH] IN BLOOD BY AUTOMATED COUNT: 12 % (ref 11.5–15)
GFR, ESTIMATED: 84 ML/MIN/1.73M2
GLUCOSE SERPL-MCNC: 99 MG/DL (ref 74–99)
HCT VFR BLD AUTO: 42.5 % (ref 37–54)
HGB BLD-MCNC: 14.8 G/DL (ref 12.5–16.5)
IMM GRANULOCYTES # BLD AUTO: 0.03 K/UL (ref 0–0.58)
IMM GRANULOCYTES NFR BLD: 1 % (ref 0–5)
INR PPP: 1
LYMPHOCYTES NFR BLD: 1.14 K/UL (ref 1.5–4)
LYMPHOCYTES RELATIVE PERCENT: 21 % (ref 20–42)
MAGNESIUM SERPL-MCNC: 1.8 MG/DL (ref 1.6–2.6)
MCH RBC QN AUTO: 30.8 PG (ref 26–35)
MCHC RBC AUTO-ENTMCNC: 34.8 G/DL (ref 32–34.5)
MCV RBC AUTO: 88.5 FL (ref 80–99.9)
MONOCYTES NFR BLD: 0.57 K/UL (ref 0.1–0.95)
MONOCYTES NFR BLD: 11 % (ref 2–12)
NEUTROPHILS NFR BLD: 64 % (ref 43–80)
NEUTS SEG NFR BLD: 3.44 K/UL (ref 1.8–7.3)
PARTIAL THROMBOPLASTIN TIME: 30.1 SEC (ref 24.5–35.1)
PLATELET # BLD AUTO: 248 K/UL (ref 130–450)
PMV BLD AUTO: 9.8 FL (ref 7–12)
POTASSIUM SERPL-SCNC: 4.2 MMOL/L (ref 3.5–5)
PROTHROMBIN TIME: 11.1 SEC (ref 9.3–12.4)
RBC # BLD AUTO: 4.8 M/UL (ref 3.8–5.8)
SODIUM SERPL-SCNC: 129 MMOL/L (ref 132–146)
WBC OTHER # BLD: 5.4 K/UL (ref 4.5–11.5)

## 2024-09-03 PROCEDURE — 76377 3D RENDER W/INTRP POSTPROCES: CPT

## 2024-09-03 PROCEDURE — 2500000003 HC RX 250 WO HCPCS: Performed by: STUDENT IN AN ORGANIZED HEALTH CARE EDUCATION/TRAINING PROGRAM

## 2024-09-03 PROCEDURE — 6360000004 HC RX CONTRAST MEDICATION: Performed by: STUDENT IN AN ORGANIZED HEALTH CARE EDUCATION/TRAINING PROGRAM

## 2024-09-03 PROCEDURE — 85730 THROMBOPLASTIN TIME PARTIAL: CPT

## 2024-09-03 PROCEDURE — 36224 PLACE CATH CAROTD ART: CPT

## 2024-09-03 PROCEDURE — C1887 CATHETER, GUIDING: HCPCS

## 2024-09-03 PROCEDURE — 80048 BASIC METABOLIC PNL TOTAL CA: CPT

## 2024-09-03 PROCEDURE — 36225 PLACE CATH SUBCLAVIAN ART: CPT

## 2024-09-03 PROCEDURE — 36415 COLL VENOUS BLD VENIPUNCTURE: CPT | Performed by: STUDENT IN AN ORGANIZED HEALTH CARE EDUCATION/TRAINING PROGRAM

## 2024-09-03 PROCEDURE — 83735 ASSAY OF MAGNESIUM: CPT

## 2024-09-03 PROCEDURE — 6360000002 HC RX W HCPCS: Performed by: STUDENT IN AN ORGANIZED HEALTH CARE EDUCATION/TRAINING PROGRAM

## 2024-09-03 PROCEDURE — 85610 PROTHROMBIN TIME: CPT

## 2024-09-03 PROCEDURE — 7100000011 HC PHASE II RECOVERY - ADDTL 15 MIN: Performed by: STUDENT IN AN ORGANIZED HEALTH CARE EDUCATION/TRAINING PROGRAM

## 2024-09-03 PROCEDURE — 85025 COMPLETE CBC W/AUTO DIFF WBC: CPT

## 2024-09-03 PROCEDURE — 75710 ARTERY X-RAYS ARM/LEG: CPT

## 2024-09-03 PROCEDURE — 36226 PLACE CATH VERTEBRAL ART: CPT

## 2024-09-03 PROCEDURE — 99153 MOD SED SAME PHYS/QHP EA: CPT | Performed by: STUDENT IN AN ORGANIZED HEALTH CARE EDUCATION/TRAINING PROGRAM

## 2024-09-03 PROCEDURE — 7100000010 HC PHASE II RECOVERY - FIRST 15 MIN: Performed by: STUDENT IN AN ORGANIZED HEALTH CARE EDUCATION/TRAINING PROGRAM

## 2024-09-03 RX ORDER — HEPARIN SODIUM 1000 [USP'U]/ML
INJECTION, SOLUTION INTRAVENOUS; SUBCUTANEOUS PRN
Status: COMPLETED | OUTPATIENT
Start: 2024-09-03 | End: 2024-09-03

## 2024-09-03 RX ORDER — MIDAZOLAM HYDROCHLORIDE 2 MG/2ML
INJECTION, SOLUTION INTRAMUSCULAR; INTRAVENOUS PRN
Status: COMPLETED | OUTPATIENT
Start: 2024-09-03 | End: 2024-09-03

## 2024-09-03 RX ORDER — HYDROXYZINE PAMOATE 25 MG/1
25 CAPSULE ORAL PRN
COMMUNITY

## 2024-09-03 RX ORDER — LIDOCAINE HYDROCHLORIDE 20 MG/ML
INJECTION, SOLUTION INFILTRATION; PERINEURAL PRN
Status: COMPLETED | OUTPATIENT
Start: 2024-09-03 | End: 2024-09-03

## 2024-09-03 RX ORDER — FENTANYL CITRATE 50 UG/ML
INJECTION, SOLUTION INTRAMUSCULAR; INTRAVENOUS PRN
Status: COMPLETED | OUTPATIENT
Start: 2024-09-03 | End: 2024-09-03

## 2024-09-03 RX ORDER — ACETAMINOPHEN 325 MG/1
650 TABLET ORAL EVERY 4 HOURS PRN
Status: DISCONTINUED | OUTPATIENT
Start: 2024-09-03 | End: 2024-09-06 | Stop reason: HOSPADM

## 2024-09-03 RX ORDER — ONDANSETRON 2 MG/ML
4 INJECTION INTRAMUSCULAR; INTRAVENOUS EVERY 6 HOURS PRN
Status: DISCONTINUED | OUTPATIENT
Start: 2024-09-03 | End: 2024-09-06 | Stop reason: HOSPADM

## 2024-09-03 RX ORDER — HEPARIN SODIUM 10000 [USP'U]/ML
INJECTION, SOLUTION INTRAVENOUS; SUBCUTANEOUS PRN
Status: COMPLETED | OUTPATIENT
Start: 2024-09-03 | End: 2024-09-03

## 2024-09-03 RX ORDER — ONDANSETRON 4 MG/1
4 TABLET, ORALLY DISINTEGRATING ORAL EVERY 8 HOURS PRN
Status: DISCONTINUED | OUTPATIENT
Start: 2024-09-03 | End: 2024-09-06 | Stop reason: HOSPADM

## 2024-09-03 RX ORDER — IOPAMIDOL 612 MG/ML
INJECTION, SOLUTION INTRAVASCULAR PRN
Status: COMPLETED | OUTPATIENT
Start: 2024-09-03 | End: 2024-09-03

## 2024-09-03 RX ORDER — HYDRALAZINE HYDROCHLORIDE 20 MG/ML
INJECTION INTRAMUSCULAR; INTRAVENOUS PRN
Status: COMPLETED | OUTPATIENT
Start: 2024-09-03 | End: 2024-09-03

## 2024-09-03 RX ORDER — HYDRALAZINE HYDROCHLORIDE 20 MG/ML
5 INJECTION INTRAMUSCULAR; INTRAVENOUS ONCE
Status: COMPLETED | OUTPATIENT
Start: 2024-09-03 | End: 2024-09-03

## 2024-09-03 RX ORDER — SODIUM CHLORIDE 9 MG/ML
INJECTION, SOLUTION INTRAVENOUS CONTINUOUS
Status: ACTIVE | OUTPATIENT
Start: 2024-09-03 | End: 2024-09-03

## 2024-09-03 RX ADMIN — Medication 5000 UNITS: at 14:38

## 2024-09-03 RX ADMIN — HYDRALAZINE HYDROCHLORIDE 10 MG: 20 INJECTION INTRAMUSCULAR; INTRAVENOUS at 14:36

## 2024-09-03 RX ADMIN — Medication 1000 ML: at 14:38

## 2024-09-03 RX ADMIN — VERAPAMIL HYDROCHLORIDE: 2.5 INJECTION INTRAVENOUS at 14:40

## 2024-09-03 RX ADMIN — MIDAZOLAM HYDROCHLORIDE 1 MG: 1 INJECTION, SOLUTION INTRAMUSCULAR; INTRAVENOUS at 14:29

## 2024-09-03 RX ADMIN — Medication 5000 UNITS: at 14:37

## 2024-09-03 RX ADMIN — HYDRALAZINE HYDROCHLORIDE 5 MG: 20 INJECTION INTRAMUSCULAR; INTRAVENOUS at 15:01

## 2024-09-03 RX ADMIN — HYDRALAZINE HYDROCHLORIDE 5 MG: 20 INJECTION INTRAMUSCULAR; INTRAVENOUS at 15:05

## 2024-09-03 RX ADMIN — LIDOCAINE HYDROCHLORIDE 10 ML: 20 INJECTION, SOLUTION INFILTRATION; PERINEURAL at 14:32

## 2024-09-03 RX ADMIN — HEPARIN SODIUM 3000 UNITS: 1000 INJECTION INTRAVENOUS; SUBCUTANEOUS at 14:44

## 2024-09-03 RX ADMIN — HYDRALAZINE HYDROCHLORIDE 5 MG: 20 INJECTION INTRAMUSCULAR; INTRAVENOUS at 08:49

## 2024-09-03 RX ADMIN — IOPAMIDOL 100 ML: 612 INJECTION, SOLUTION INTRAVENOUS at 15:30

## 2024-09-03 RX ADMIN — FENTANYL CITRATE 50 MCG: 50 INJECTION, SOLUTION INTRAMUSCULAR; INTRAVENOUS at 14:30

## 2024-09-03 ASSESSMENT — PAIN - FUNCTIONAL ASSESSMENT: PAIN_FUNCTIONAL_ASSESSMENT: NONE - DENIES PAIN

## 2024-09-03 NOTE — BRIEF OP NOTE
Neurointerventional surgery brief post procedure note    Date of Service: 24    Patient Name: Iain Husain   : 1970  Medical record number:  [id number]    Procedure: Catheter-based diagnostic cervical and cerebral angiogram.  Physician: Bello Chowdhury MD.  Assistant: KEANU Mcarthur.  Preoperative diagnosis: Right vertebral artery aneurysm.  Postoperative diagnosis: Same.  Access: Right proximal radial artery.  Vessels injected: Right vertebral artery, left subclavian artery, right internal carotid artery.  Hemostasis: TR band with 11 cc of air.  Anesthesia: Local lidocaine, conscious sedation more than 15 minutes.  Specimens: None.  Blood loss: Less than 30 cc.  Complications: None immediate.    Impression/Findings:   1.  Fusiform aneurysmal dilatation of the right vertebral artery intra dural V4 segment.  This aneurysmal dilatation involves the opacification of the right posterior inferior cerebellar artery.  2.  Left posterior inferior cerebellar artery is supplied retrograde through the VB junction.  3.  Severe dolichoectasia of the basilar artery with flattening of the basilar tip.  4.  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    Plan:  1. Neurovascular and vital signs checks: Q15 min x 4, Q30 min x 2, Q60 min x 2 and then per unit protocol.  2. Long-term systolic blood pressure goal less than 140 mmHg.  3. Continue dual antiplatelet therapy with aspirin 325 mg and Plavix 75 mg for a total of 90 days from the initial stroke.  4. Okay for discharge once the arteriotomy site care is over.  5. See me in the clinic in 2 months to discuss about further options of management of right vertebral artery aneurysm.    Patient/family updated.    Bello Chowdhury M.D.  Vascular Neurology & Neurointerventional Surgery

## 2024-09-03 NOTE — PROCEDURES
0740 Patient arrived to Radiology department for cerebral angiogram. Vital signs taken. IV placed, blood obtained, IV flushed and prn adapter attached. Blood sample sent to lab for ordered tests. Allergies, home medications, medical history, H&P and fasting instructions reviewed with patient. Paper chart reviewed for correct documents.     0830 Procedural instructions given, questions answered, understanding expressed and consent signed.

## 2024-09-03 NOTE — PROGRESS NOTES
Patient returned from procedure.  Patient stable. No s/s of complications noted or reported. Vitals and vascular checks with pulses, pain,  pallor & temperature will be checked q 15min.  TR band applied in room at 1523  Pulse oximeter placed on hand where TR band is applied.     TR band air removal per guideline/order with 2ml air removed from balloon and assessment of pulse, pallor, temperature and pain [id number].     1523(time) 11 ml air placed in balloon in room   1615 2ml air removed to 9 ml  1630 2ml air removed to 7ml.   1645 2ml air removed to 5ml.   1700 2ml air removed to 3ml.   1715 3 ml air removed to 0ml and TR band left in place with 0ml air in balloon  1730 TR band removed, pressure dressing applied  If bleeding occurs during removal, insert enough air to restore hemostasis. Wait 15 min, then repeat the removal process.     ***Patient eating and drinking well with no s/s of complications noted or reported.    Patient discharged, TR band site was checked with every set of vitals. Site clean dry and intact. Discharge papers reviewed with patient, questions answered, discharge paper signed. Patient taken to door via ***. Patient in stable condition, no s/s of complications noted or reported.

## 2024-09-04 ENCOUNTER — HOSPITAL ENCOUNTER (OUTPATIENT)
Dept: OCCUPATIONAL THERAPY | Age: 54
Setting detail: THERAPIES SERIES
Discharge: HOME | End: 2024-09-04
Payer: COMMERCIAL

## 2024-09-04 PROCEDURE — 97530 THERAPEUTIC ACTIVITIES: CPT

## 2024-09-04 NOTE — INTERVAL H&P NOTE
Update History & Physical    The patient's History and Physical of August 13, 2024 was reviewed with the patient and I examined the patient. There was no change. The surgical site was confirmed by the patient and me.     Plan: The risks, benefits, expected outcome, and alternative to the recommended procedure have been discussed with the patient. Patient understands and wants to proceed with the procedure.     Pre-procedure conscious/moderate sedation evaluation:    ASA 2.  MPC 2.     Bello Chowdhury M.D.  Vascular Neurology & Neurointerventional Surgery

## 2024-09-04 NOTE — PROGRESS NOTES
AAROM:               PROM/Stretching:               Scar Mass/Edema Control:               Strengthening:     Gross UE X UBE x 8 mins walter/uni hands forward/reverse w/ no resistance while following verbal directions from therapist for improving fxl activity tolerance while relaxing UE's with fluid movement - for improving multitasking ability        Other:     HEP X Mixed puzzle books, deck of card activities, computer activities          Assessment/Comments:  Pt arrives to tx session reporting \"  I had my angiogram done yesterday and I am feeling very tired and not as good as usual today.\" Completed visual scanning with cognitive steps to find objects in a physical large room- ^d challenge level with varied color objects- some colors are harder for pt to identify such as blue- verbal cueing required for 4 out of 10 objects today.  Therapist adds UBE x 8 mins walter/uni hands forward/reverse w/ no resistance while following verbal directions from therapist for improving fxl activity tolerance while relaxing UE's with fluid movement - for improving multitasking ability.  Left side neglect addressed with all of todays tasks. Pt reports ^d fatigue post tx but was able to complete all assigned tasks. Continue to progress pt towards his established POC goals with focus on improving visual deficits, decreasing left side neglect and ^ing multitasking challenges.      -Rehab Potential: Good   -Patient Response to Treatment: Good engagement and participation throughout session    Patient. Education:  [] Plans/Goals, Risks/Benefits discussed  [] Home exercise program  Method of Education: [x] Verbal  [x] Demo  [] Written  Comprehension of Education:  [x] Verbalizes understanding.  [x] Demonstrates understanding.  [] Needs Review.  [] Demonstrates/verbalizes understanding of HEP/Ed previously given.      Time In: 3:30pm           Time Out: 4:30pm           CODE  Minutes  Units   70506 Fluidotherapy     23549 [place]     [id number]

## 2024-09-09 ENCOUNTER — HOSPITAL ENCOUNTER (OUTPATIENT)
Dept: OCCUPATIONAL THERAPY | Age: 54
Setting detail: THERAPIES SERIES
Discharge: HOME | End: 2024-09-09
Payer: COMMERCIAL

## 2024-09-11 ENCOUNTER — HOSPITAL ENCOUNTER (OUTPATIENT)
Dept: OCCUPATIONAL THERAPY | Age: 54
Setting detail: THERAPIES SERIES
Discharge: HOME | End: 2024-09-11
Payer: COMMERCIAL

## 2024-09-11 ENCOUNTER — HOSPITAL ENCOUNTER (OUTPATIENT)
Dept: SPEECH THERAPY | Age: 54
Setting detail: THERAPIES SERIES
Discharge: HOME | End: 2024-09-11
Payer: COMMERCIAL

## 2024-09-11 PROCEDURE — 97530 THERAPEUTIC ACTIVITIES: CPT

## 2024-09-11 PROCEDURE — 92507 TX SP LANG VOICE COMM INDIV: CPT | Performed by: SPEECH-LANGUAGE PATHOLOGIST

## 2024-09-16 ENCOUNTER — HOSPITAL ENCOUNTER (OUTPATIENT)
Dept: OCCUPATIONAL THERAPY | Age: 54
Setting detail: THERAPIES SERIES
Discharge: HOME | End: 2024-09-16
Payer: COMMERCIAL

## 2024-09-25 ENCOUNTER — HOSPITAL ENCOUNTER (OUTPATIENT)
Dept: OCCUPATIONAL THERAPY | Age: 54
Setting detail: THERAPIES SERIES
Discharge: HOME | End: 2024-09-25
Payer: COMMERCIAL

## 2024-09-25 PROCEDURE — 97530 THERAPEUTIC ACTIVITIES: CPT

## 2024-09-30 ENCOUNTER — HOSPITAL ENCOUNTER (OUTPATIENT)
Dept: OCCUPATIONAL THERAPY | Age: 54
Setting detail: THERAPIES SERIES
Discharge: HOME | End: 2024-09-30
Payer: COMMERCIAL

## 2024-09-30 PROCEDURE — 97530 THERAPEUTIC ACTIVITIES: CPT

## 2024-09-30 NOTE — PROGRESS NOTES
OCCUPATIONAL THERAPY PROGRESS UPDATE/ RECERT  [facility]  [facility]  [address] [facility] [address]  Dept: [phone]  Loc: [phone]   [facility] OT Fax: [phone]      Date:  2024     Initial Evaluation Date: 24                                  Evaluating Therapist: Danielle Roger OT     Patient Name:  Iain Husain                         :  1970     Restrictions/Precautions:  Aphasia, low fall risk  Diagnosis:  I63.511 (ICD-10-CM) - Cerebral infarction due to unspecified occlusion or stenosis of right middle cerebral artery                                                                Date of Surgery/Injury: 24     Insurance/Certification information:  [employer] [address]   25  visits combined PT/OT /chiro then auth  Then call [employer] [phone]  Plan of care signed (Y/N): N  Visit# / total visits:      Referring Practitioner:  Paulette Howard MD     Specific Practitioner Orders: OT Eval & Treat     Reason for Referral: Pt is a 53 y.o. male who on 24 experienced AMS and was found to have R MCA occlusion. Imaging showed a right posterior parietal occipital infarction. Pt underwent thrombectomy. Pt now presents to outpatient OT for eval & treat.     Assessment of current deficits   [] Functional mobility             [x] ADLs           [] Strength                  [x] Cognition   [] Functional transfers           [x] IADLs          [] Safety Awareness  [x] Endurance   [] Fine Motor Coordination    [] Balance      [x] Vision/perception    [] Sensation     [] Gross Motor Coordination [] ROM           [] Pain                        [] Edema          [] Scar Adhesion/Skin Integrity [] Motor Control [] Postural Alignment      OT PLAN OF CARE   OT POC based on physician orders, patient diagnosis and results of clinical assessment     Frequency/Duration: Frequency/Duration 1-2x / week for 18 visits.   Certification period From: 24  To:

## 2024-10-09 ENCOUNTER — HOSPITAL ENCOUNTER (OUTPATIENT)
Dept: OCCUPATIONAL THERAPY | Age: 54
Setting detail: THERAPIES SERIES
Discharge: HOME | End: 2024-10-09
Payer: COMMERCIAL

## 2024-10-09 PROCEDURE — 97530 THERAPEUTIC ACTIVITIES: CPT

## 2024-10-09 PROCEDURE — 97110 THERAPEUTIC EXERCISES: CPT

## 2024-10-16 ENCOUNTER — HOSPITAL ENCOUNTER (OUTPATIENT)
Dept: OCCUPATIONAL THERAPY | Age: 54
Setting detail: THERAPIES SERIES
Discharge: HOME | End: 2024-10-16
Payer: COMMERCIAL

## 2024-10-16 PROCEDURE — 97530 THERAPEUTIC ACTIVITIES: CPT | Performed by: OCCUPATIONAL THERAPIST

## 2024-10-16 NOTE — PROGRESS NOTES
OCCUPATIONAL THERAPY DAILY TX NOTE  [facility]  [facility]  [address] [facility] [address]  Dept: [phone]  Loc: [phone]   [facility] OT Fax: [phone]      Date:  10/16/2024     Initial Evaluation Date: 24                                  Evaluating Therapist: Danielle Roger OT     Patient Name:  Iain Husain                         :  1970     Restrictions/Precautions:  Aphasia, low fall risk  Diagnosis:  I63.511 (ICD-10-CM) - Cerebral infarction due to unspecified occlusion or stenosis of right middle cerebral artery                                                                Date of Surgery/Injury: 24     Insurance/Certification information:  [employer] [address]   25  visits combined PT/OT /chiro then auth  Then call [employer] [phone]  Plan of care signed (Y/N): N- new recert sent for last week  Visit# / total visits:      Referring Practitioner:  Paulette Howard MD     Specific Practitioner Orders: OT Eval & Treat     Reason for Referral: Pt is a 53 y.o. male who on 24 experienced AMS and was found to have R MCA occlusion. Imaging showed a right posterior parietal occipital infarction. Pt underwent thrombectomy. Pt now presents to outpatient OT for eval & treat.     Assessment of current deficits   [] Functional mobility             [x] ADLs           [] Strength                  [x] Cognition   [] Functional transfers           [x] IADLs          [] Safety Awareness  [x] Endurance   [] Fine Motor Coordination    [] Balance      [x] Vision/perception    [] Sensation     [] Gross Motor Coordination [] ROM           [] Pain                        [] Edema          [] Scar Adhesion/Skin Integrity [] Motor Control [] Postural Alignment      OT PLAN OF CARE   OT POC based on physician orders, patient diagnosis and results of clinical assessment     Frequency/Duration: Frequency/Duration 1-2x / week for 18 visits.   Certification period

## 2024-10-23 ENCOUNTER — HOSPITAL ENCOUNTER (OUTPATIENT)
Dept: OCCUPATIONAL THERAPY | Age: 54
Setting detail: THERAPIES SERIES
Discharge: HOME | End: 2024-10-23
Payer: COMMERCIAL

## 2024-10-23 NOTE — PROGRESS NOTES
Occupational Therapy      Phone: [phone] Fax: [phone]     Occupational Therapy   Cancellation Note     Patient Name:  Iain Husain  : 1970  Date:  10/23/2024  MRN: [id number]    For today's appointment patient:   [x]  Cancelled   [x]  Rescheduled appointment   []  No-show     Reason given by patient:   []  Patient ill   []  Conflicting appointment   []  No transportation   [x]  Conflict with work   []  No reason given   []  Other:    Comments:     Electronically signed by: Jaleesa KWOK, [id number]

## 2024-10-30 ENCOUNTER — HOSPITAL ENCOUNTER (OUTPATIENT)
Dept: OCCUPATIONAL THERAPY | Age: 54
Setting detail: THERAPIES SERIES
Discharge: HOME | End: 2024-10-30
Payer: COMMERCIAL

## 2024-10-30 PROCEDURE — 97530 THERAPEUTIC ACTIVITIES: CPT

## 2024-10-30 NOTE — PROGRESS NOTES
OCCUPATIONAL THERAPY DAILY TX NOTE  [facility]  [facility]  [address] [facility] [address]  Dept: [phone]  Loc: [phone]   [facility] OT Fax: [phone]      Date:  10/30/2024     Initial Evaluation Date: 24                                  Evaluating Therapist: Danielle Roger OT     Patient Name:  Iain Husain                         :  1970     Restrictions/Precautions:  Aphasia, low fall risk  Diagnosis:  I63.511 (ICD-10-CM) - Cerebral infarction due to unspecified occlusion or stenosis of right middle cerebral artery                                                                Date of Surgery/Injury: 24     Insurance/Certification information:  [employer] [address]   25  visits combined PT/OT /chiro then auth  Then call [employer] [phone]  Plan of care signed (Y/N): N- new recert sent for last week  Visit# / total visits: 3/ 12     Referring Practitioner:  Paulette Howard MD     Specific Practitioner Orders: OT Eval & Treat     Reason for Referral: Pt is a 53 y.o. male who on 24 experienced AMS and was found to have R MCA occlusion. Imaging showed a right posterior parietal occipital infarction. Pt underwent thrombectomy. Pt now presents to outpatient OT for eval & treat.     Assessment of current deficits   [] Functional mobility             [x] ADLs           [] Strength                  [x] Cognition   [] Functional transfers           [x] IADLs          [] Safety Awareness  [x] Endurance   [] Fine Motor Coordination    [] Balance      [x] Vision/perception    [] Sensation     [] Gross Motor Coordination [] ROM           [] Pain                        [] Edema          [] Scar Adhesion/Skin Integrity [] Motor Control [] Postural Alignment      OT PLAN OF CARE   OT POC based on physician orders, patient diagnosis and results of clinical assessment     Frequency/Duration: Frequency/Duration 1-2x / week for 18 visits.   Certification period

## 2024-11-06 ENCOUNTER — HOSPITAL ENCOUNTER (OUTPATIENT)
Dept: OCCUPATIONAL THERAPY | Age: 54
Setting detail: THERAPIES SERIES
Discharge: HOME | End: 2024-11-06
Payer: COMMERCIAL

## 2024-11-06 PROCEDURE — 97530 THERAPEUTIC ACTIVITIES: CPT

## 2024-11-06 NOTE — PROGRESS NOTES
tasks to facilitate multitasking, short term memory and processing of information for improving pts abilities with work related tasks. Pt conts to report he feels very challenged by session exercises. Pt reports multitasking & patterning/ sequencing activities that are built upon are difficult to perform but he does display improving abilities when completing and also displays improving processing of information. Pt can remember 3-4 steps of verbal instructions and follow thru steps correctly 75% of the time. Next session to further focus on number sequencing and multitasking for improving pts abilities w/ work related tasks.       -Rehab Potential: Good   -Patient Response to Treatment: Max mental fatigue by end of session.    Patient. Education:  [] Plans/Goals, Risks/Benefits discussed  [] Home exercise program  Method of Education: [x] Verbal  [x] Demo  [] Written  Comprehension of Education:  [x] Verbalizes understanding.  [x] Demonstrates understanding.  [] Needs Review.  [] Demonstrates/verbalizes understanding of HEP/Ed previously given.      Time In: 3:30pm           Time Out: 4:30 pm           CODE  Minutes  Units   13967 Fluidotherapy     44895 Paraffin     15652 Ultrasound     89491 Electrical Stim - Attended     13297 Iontophoresis     60936 Therapeutic Ex     12205 Therapeutic Activity 60 4   74949 Neuromuscular Re-Ed     87573 Manual Therapy     33077 ADL/COMP Tech Train     95395 Orthotic Management/Training      Other                 Total  60 4       Plan: OT 1-2x / week for 12 visits.   Certification period From: 9/30/24  To: 12/30/24     []  Continues Plan of care with focus on AE/compensatory/adaptive strategies during ADL/IADLs, spatial awareness, spatial relations, visual scanning, and ADL/IADL participation for reduced need for assistance with daily activities : Treatment covered based on POC and graduated to patient's progress. Pt education continues at each visit to obtain maximum benefits

## 2024-11-13 ENCOUNTER — HOSPITAL ENCOUNTER (OUTPATIENT)
Dept: OCCUPATIONAL THERAPY | Age: 54
Setting detail: THERAPIES SERIES
Discharge: HOME | End: 2024-11-13
Payer: COMMERCIAL

## 2024-11-13 NOTE — PROGRESS NOTES
Occupational Therapy      Phone: [phone] Fax: [phone]     Occupational Therapy   Cancellation/No-show Note     Patient Name:  Iain Husain  : 1970  Date:  2024  MRN: [id number]    For today's appointment patient:   []  Cancelled   []  Rescheduled appointment   [x]  No-show     Reason given by patient:   []  Patient ill   []  Conflicting appointment   []  No transportation   []  Conflict with work   []  No reason given   []  Other:    Comments:     Electronically signed by: Jaleesa KWOK, [id number]

## 2024-11-20 ENCOUNTER — HOSPITAL ENCOUNTER (OUTPATIENT)
Dept: OCCUPATIONAL THERAPY | Age: 54
Setting detail: THERAPIES SERIES
Discharge: HOME | End: 2024-11-20
Payer: COMMERCIAL

## 2024-11-20 NOTE — PROGRESS NOTES
Occupational Therapy      Phone: [phone] Fax: [phone]     Occupational Therapy   Cancellation/No-show Note     Patient Name:  Iain Husain  : 1970  Date:  2024  MRN: [id number]    For today's appointment patient:   []  Cancelled   []  Rescheduled appointment   [x]  No-show     Reason given by patient:   []  Patient ill   []  Conflicting appointment   []  No transportation   []  Conflict with work   []  No reason given   [x]  Other:    Comments: Pt has no further appts scheduled    Electronically signed by: Jaleesa KWOK, [id number]

## 2025-06-24 ENCOUNTER — OFFICE VISIT (OUTPATIENT)
Age: 55
End: 2025-06-24
Payer: COMMERCIAL

## 2025-06-24 VITALS
DIASTOLIC BLOOD PRESSURE: 102 MMHG | SYSTOLIC BLOOD PRESSURE: 160 MMHG | HEART RATE: 68 BPM | BODY MASS INDEX: 37.1 KG/M2 | RESPIRATION RATE: 18 BRPM | WEIGHT: 266 LBS

## 2025-06-24 DIAGNOSIS — I10 PRIMARY HYPERTENSION: ICD-10-CM

## 2025-06-24 DIAGNOSIS — I67.1 INTRACRANIAL ANEURYSM: ICD-10-CM

## 2025-06-24 DIAGNOSIS — I63.511 CEREBROVASCULAR ACCIDENT (CVA) DUE TO OCCLUSION OF RIGHT MIDDLE CEREBRAL ARTERY (HCC): Primary | ICD-10-CM

## 2025-06-24 PROCEDURE — 1036F TOBACCO NON-USER: CPT | Performed by: STUDENT IN AN ORGANIZED HEALTH CARE EDUCATION/TRAINING PROGRAM

## 2025-06-24 PROCEDURE — 3077F SYST BP >= 140 MM HG: CPT | Performed by: STUDENT IN AN ORGANIZED HEALTH CARE EDUCATION/TRAINING PROGRAM

## 2025-06-24 PROCEDURE — G8417 CALC BMI ABV UP PARAM F/U: HCPCS | Performed by: STUDENT IN AN ORGANIZED HEALTH CARE EDUCATION/TRAINING PROGRAM

## 2025-06-24 PROCEDURE — 99214 OFFICE O/P EST MOD 30 MIN: CPT | Performed by: STUDENT IN AN ORGANIZED HEALTH CARE EDUCATION/TRAINING PROGRAM

## 2025-06-24 PROCEDURE — 3080F DIAST BP >= 90 MM HG: CPT | Performed by: STUDENT IN AN ORGANIZED HEALTH CARE EDUCATION/TRAINING PROGRAM

## 2025-06-24 PROCEDURE — 3017F COLORECTAL CA SCREEN DOC REV: CPT | Performed by: STUDENT IN AN ORGANIZED HEALTH CARE EDUCATION/TRAINING PROGRAM

## 2025-06-24 PROCEDURE — G8427 DOCREV CUR MEDS BY ELIG CLIN: HCPCS | Performed by: STUDENT IN AN ORGANIZED HEALTH CARE EDUCATION/TRAINING PROGRAM

## 2025-06-24 RX ORDER — OXCARBAZEPINE 300 MG/1
600 TABLET, FILM COATED ORAL 2 TIMES DAILY
COMMUNITY

## 2025-06-24 NOTE — PROGRESS NOTES
Brief HPI & Interval History:     I saw Mr. Husain in the office on 11/19/2024.  I previously did thrombectomy for right MCA occlusion back in June 2024 and the etiology for MCA occlusion back then was iCAD.  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    His blood pressure is significantly elevated today at 160/102 mmHg.  He is on full dose aspirin.  He has gained significant weight and has recently been drinking a lot of beer.  No smoking.  He is a  by occupation and has not noted any difficulty with his work.    ROS:     All systems were reviewed and are negative for any complaints at this time.    Objective:     BP (!) 152/105 (BP Site: Right Upper Arm, Patient Position: Sitting, BP Cuff Size: Medium Adult)   Pulse 68   Resp 18   Wt 120.7 kg (266 lb)   BMI 37.10 kg/m²     Constitutional: Well developed, well nourished and in no acute distress.   Respiratory: Clear to auscultation bilaterally with no use of accessory muscles during respiration.   Cardiovascular:  No murmurs auscultated. Carotid arteries without bruits. Pedal pulses and radial pulses 2+ bilaterally. No edema in all four extremities.      Neurological:     No acute distress.  Good fund of knowledge.  Full range of extraocular movements.  There is still residual left inferior quadrant visual field deficit but this has improved from before.  Face symmetric.  No aphasia.  No dysarthria.  Left upper extremity strength is 4/5.  Left hand  is almost 100%.  Left lower extremity strength 4/5.  Sensations intact to light touch in all 4 extremities.  Symmetric bilateral arm swing today.  Normal gait.      Laboratory/Radiology:     CBC with Differential:    Lab Results   Component Value Date/Time    WBC 5.4 09/03/2024 07:38 AM    RBC 4.80 09/03/2024 07:38 AM    HGB 14.8 09/03/2024 07:38 AM    HCT 42.5 09/03/2024 07:38 AM